# Patient Record
Sex: MALE | Race: BLACK OR AFRICAN AMERICAN | NOT HISPANIC OR LATINO | URBAN - METROPOLITAN AREA
[De-identification: names, ages, dates, MRNs, and addresses within clinical notes are randomized per-mention and may not be internally consistent; named-entity substitution may affect disease eponyms.]

---

## 2020-04-11 ENCOUNTER — EMERGENCY (EMERGENCY)
Facility: HOSPITAL | Age: 80
LOS: 0 days | Discharge: HOME | End: 2020-04-11
Attending: EMERGENCY MEDICINE
Payer: COMMERCIAL

## 2020-04-11 VITALS
SYSTOLIC BLOOD PRESSURE: 156 MMHG | DIASTOLIC BLOOD PRESSURE: 96 MMHG | HEART RATE: 101 BPM | TEMPERATURE: 103 F | RESPIRATION RATE: 21 BRPM

## 2020-04-11 VITALS
TEMPERATURE: 102 F | OXYGEN SATURATION: 95 % | SYSTOLIC BLOOD PRESSURE: 156 MMHG | DIASTOLIC BLOOD PRESSURE: 85 MMHG | HEART RATE: 101 BPM | RESPIRATION RATE: 20 BRPM

## 2020-04-11 DIAGNOSIS — E11.9 TYPE 2 DIABETES MELLITUS WITHOUT COMPLICATIONS: ICD-10-CM

## 2020-04-11 DIAGNOSIS — U07.1 COVID-19: ICD-10-CM

## 2020-04-11 DIAGNOSIS — R53.1 WEAKNESS: ICD-10-CM

## 2020-04-11 DIAGNOSIS — J12.89 OTHER VIRAL PNEUMONIA: ICD-10-CM

## 2020-04-11 DIAGNOSIS — R10.9 UNSPECIFIED ABDOMINAL PAIN: ICD-10-CM

## 2020-04-11 DIAGNOSIS — R50.9 FEVER, UNSPECIFIED: ICD-10-CM

## 2020-04-11 DIAGNOSIS — I10 ESSENTIAL (PRIMARY) HYPERTENSION: ICD-10-CM

## 2020-04-11 LAB
ALBUMIN SERPL ELPH-MCNC: 3 G/DL — LOW (ref 3.3–5)
ALP SERPL-CCNC: 54 U/L — SIGNIFICANT CHANGE UP (ref 40–120)
ALT FLD-CCNC: 18 U/L — SIGNIFICANT CHANGE UP (ref 12–78)
ANION GAP SERPL CALC-SCNC: 8 MMOL/L — SIGNIFICANT CHANGE UP (ref 5–17)
APTT BLD: 36.1 SEC — SIGNIFICANT CHANGE UP (ref 28.5–37)
AST SERPL-CCNC: 22 U/L — SIGNIFICANT CHANGE UP (ref 15–37)
BASOPHILS # BLD AUTO: 0.01 K/UL — SIGNIFICANT CHANGE UP (ref 0–0.2)
BASOPHILS NFR BLD AUTO: 0.2 % — SIGNIFICANT CHANGE UP (ref 0–2)
BILIRUB SERPL-MCNC: 0.5 MG/DL — SIGNIFICANT CHANGE UP (ref 0.2–1.2)
BUN SERPL-MCNC: 14 MG/DL — SIGNIFICANT CHANGE UP (ref 7–23)
CALCIUM SERPL-MCNC: 8.1 MG/DL — LOW (ref 8.5–10.1)
CHLORIDE SERPL-SCNC: 104 MMOL/L — SIGNIFICANT CHANGE UP (ref 96–108)
CO2 SERPL-SCNC: 27 MMOL/L — SIGNIFICANT CHANGE UP (ref 22–31)
CREAT SERPL-MCNC: 1.23 MG/DL — SIGNIFICANT CHANGE UP (ref 0.5–1.3)
EOSINOPHIL # BLD AUTO: 0 K/UL — SIGNIFICANT CHANGE UP (ref 0–0.5)
EOSINOPHIL NFR BLD AUTO: 0 % — SIGNIFICANT CHANGE UP (ref 0–6)
GLUCOSE SERPL-MCNC: 92 MG/DL — SIGNIFICANT CHANGE UP (ref 70–99)
HCT VFR BLD CALC: 39.9 % — SIGNIFICANT CHANGE UP (ref 39–50)
HGB BLD-MCNC: 14.1 G/DL — SIGNIFICANT CHANGE UP (ref 13–17)
IMM GRANULOCYTES NFR BLD AUTO: 0.6 % — SIGNIFICANT CHANGE UP (ref 0–1.5)
INR BLD: 1.15 RATIO — SIGNIFICANT CHANGE UP (ref 0.88–1.16)
LACTATE SERPL-SCNC: 1.3 MMOL/L — SIGNIFICANT CHANGE UP (ref 0.7–2)
LIDOCAIN IGE QN: 266 U/L — SIGNIFICANT CHANGE UP (ref 73–393)
LYMPHOCYTES # BLD AUTO: 0.39 K/UL — LOW (ref 1–3.3)
LYMPHOCYTES # BLD AUTO: 8 % — LOW (ref 13–44)
MCHC RBC-ENTMCNC: 27.3 PG — SIGNIFICANT CHANGE UP (ref 27–34)
MCHC RBC-ENTMCNC: 35.3 GM/DL — SIGNIFICANT CHANGE UP (ref 32–36)
MCV RBC AUTO: 77.3 FL — LOW (ref 80–100)
MONOCYTES # BLD AUTO: 0.24 K/UL — SIGNIFICANT CHANGE UP (ref 0–0.9)
MONOCYTES NFR BLD AUTO: 4.9 % — SIGNIFICANT CHANGE UP (ref 2–14)
NEUTROPHILS # BLD AUTO: 4.21 K/UL — SIGNIFICANT CHANGE UP (ref 1.8–7.4)
NEUTROPHILS NFR BLD AUTO: 86.3 % — HIGH (ref 43–77)
NRBC # BLD: 0 /100 WBCS — SIGNIFICANT CHANGE UP (ref 0–0)
PLATELET # BLD AUTO: 88 K/UL — LOW (ref 150–400)
POTASSIUM SERPL-MCNC: 4.1 MMOL/L — SIGNIFICANT CHANGE UP (ref 3.5–5.3)
POTASSIUM SERPL-SCNC: 4.1 MMOL/L — SIGNIFICANT CHANGE UP (ref 3.5–5.3)
PROT SERPL-MCNC: 6.9 GM/DL — SIGNIFICANT CHANGE UP (ref 6–8.3)
PROTHROM AB SERPL-ACNC: 12.9 SEC — SIGNIFICANT CHANGE UP (ref 10–12.9)
RBC # BLD: 5.16 M/UL — SIGNIFICANT CHANGE UP (ref 4.2–5.8)
RBC # FLD: 13.9 % — SIGNIFICANT CHANGE UP (ref 10.3–14.5)
SARS-COV-2 RNA SPEC QL NAA+PROBE: DETECTED
SODIUM SERPL-SCNC: 139 MMOL/L — SIGNIFICANT CHANGE UP (ref 135–145)
TROPONIN I SERPL-MCNC: 0.02 NG/ML — SIGNIFICANT CHANGE UP (ref 0.01–0.04)
WBC # BLD: 4.88 K/UL — SIGNIFICANT CHANGE UP (ref 3.8–10.5)
WBC # FLD AUTO: 4.88 K/UL — SIGNIFICANT CHANGE UP (ref 3.8–10.5)

## 2020-04-11 PROCEDURE — 99284 EMERGENCY DEPT VISIT MOD MDM: CPT

## 2020-04-11 PROCEDURE — 71045 X-RAY EXAM CHEST 1 VIEW: CPT | Mod: 26

## 2020-04-11 RX ORDER — ACETAMINOPHEN 500 MG
975 TABLET ORAL ONCE
Refills: 0 | Status: COMPLETED | OUTPATIENT
Start: 2020-04-11 | End: 2020-04-11

## 2020-04-11 RX ORDER — ACETAMINOPHEN 500 MG
2 TABLET ORAL
Qty: 56 | Refills: 0
Start: 2020-04-11 | End: 2020-04-17

## 2020-04-11 RX ADMIN — Medication 975 MILLIGRAM(S): at 16:15

## 2020-04-11 NOTE — ED PROVIDER NOTE - OBJECTIVE STATEMENT
Pt is a 80 year old male w/no significant PMH who presents to the ED today for fever x 7 days. Pt c/o abdominal pain. Denies N/V/D, SOB, cough, headache, or chills. His son in law has COVID 19. Patient denies EtOH/tobacco/illicit substance use.

## 2020-04-11 NOTE — ED PROVIDER NOTE - CLINICAL SUMMARY MEDICAL DECISION MAKING FREE TEXT BOX
Pt with Covid related abd discomfort nontender abd, xray chest noted Covid like infiltrate - will dc with tylenol.

## 2020-04-11 NOTE — ED PROVIDER NOTE - PATIENT PORTAL LINK FT
You can access the FollowMyHealth Patient Portal offered by [facility] by registering at the following website: [email or web address]. By joining [facility]’s FollowMyHealth portal, you will also be able to view your health information using other applications (apps) compatible with our system.

## 2020-04-11 NOTE — ED ADULT NURSE NOTE - OBJECTIVE STATEMENT
received er bed 9 biba from home with fever,generalized weakness and abdominal discomfort x 1 week family member at homewith + confirmed covid a&ox3 denies vomiting or diarrhea abdomen soft no distention denies cough or shortness of breath mild basilar crackles audible

## 2024-08-29 NOTE — ED PROVIDER NOTE - NS_ATTENDINGSCRIBE_ED_ALL_ED
The patient arrives via [facility] [place] from [facility]. Pt was sitting at the table after breakfast and staff witnessed a syncopal episode lasting 2-3 minutes. Pt head down, no fall, no injuroes. Pt awoke and had an epiod eof vomiting. Pt is alert x self only baseline, Pt fell a couple of weeks ago and has c/o back pain. Pt stating owe with movement. Rn noted pain with movement.      Triage Assessment (Adult)       Row Name 08/29/24 1046          Triage Assessment    Airway WDL WDL        Respiratory WDL    Respiratory WDL WDL        Skin Circulation/Temperature WDL    Skin Circulation/Temperature WDL WDL        Cardiac WDL    Cardiac WDL X        Peripheral/Neurovascular WDL    Peripheral Neurovascular WDL WDL        Cognitive/Neuro/Behavioral WDL    Cognitive/Neuro/Behavioral WDL X;orientation     Level of Consciousness confused;alert     Orientation disoriented to;place;time;situation     Speech clear     Mood/Behavior anxious        Toir Coma Scale    Best Eye Response 4-->(E4) spontaneous     Best Motor Response 6-->(M6) obeys commands     Best Verbal Response 4-->(V4) confused     [place] Coma Scale Score 14                     
I personally performed the service described in the documentation recorded by the scribe in my presence, and it accurately and completely records my words and actions.

## 2025-06-17 ENCOUNTER — INPATIENT (INPATIENT)
Facility: HOSPITAL | Age: 85
LOS: 5 days | Discharge: HOME | End: 2025-06-23
Attending: STUDENT IN AN ORGANIZED HEALTH CARE EDUCATION/TRAINING PROGRAM | Admitting: STUDENT IN AN ORGANIZED HEALTH CARE EDUCATION/TRAINING PROGRAM
Payer: MEDICAID

## 2025-06-17 VITALS
SYSTOLIC BLOOD PRESSURE: 174 MMHG | DIASTOLIC BLOOD PRESSURE: 86 MMHG | WEIGHT: 171.74 LBS | HEART RATE: 108 BPM | TEMPERATURE: 97 F | OXYGEN SATURATION: 96 % | RESPIRATION RATE: 15 BRPM | HEIGHT: 75 IN

## 2025-06-17 DIAGNOSIS — I10 ESSENTIAL (PRIMARY) HYPERTENSION: ICD-10-CM

## 2025-06-17 DIAGNOSIS — R47.01 APHASIA: ICD-10-CM

## 2025-06-17 DIAGNOSIS — N17.9 ACUTE KIDNEY FAILURE, UNSPECIFIED: ICD-10-CM

## 2025-06-17 DIAGNOSIS — T83.511A INFECTION AND INFLAMMATORY REACTION DUE TO INDWELLING URETHRAL CATHETER, INITIAL ENCOUNTER: ICD-10-CM

## 2025-06-17 LAB
ALBUMIN SERPL ELPH-MCNC: 3.8 G/DL — SIGNIFICANT CHANGE UP (ref 3.3–5)
ALP SERPL-CCNC: 74 U/L — SIGNIFICANT CHANGE UP (ref 40–120)
ALT FLD-CCNC: 21 U/L — SIGNIFICANT CHANGE UP (ref 12–78)
ANION GAP SERPL CALC-SCNC: 5 MMOL/L — SIGNIFICANT CHANGE UP (ref 5–17)
APPEARANCE UR: ABNORMAL
APPEARANCE UR: CLEAR — SIGNIFICANT CHANGE UP
APTT BLD: 27.8 SEC — SIGNIFICANT CHANGE UP (ref 26.1–36.8)
AST SERPL-CCNC: 23 U/L — SIGNIFICANT CHANGE UP (ref 15–37)
BACTERIA # UR AUTO: ABNORMAL /HPF
BASOPHILS # BLD AUTO: 0.02 K/UL — SIGNIFICANT CHANGE UP (ref 0–0.2)
BASOPHILS NFR BLD AUTO: 0.2 % — SIGNIFICANT CHANGE UP (ref 0–2)
BILIRUB SERPL-MCNC: 0.7 MG/DL — SIGNIFICANT CHANGE UP (ref 0.2–1.2)
BILIRUB UR-MCNC: NEGATIVE — SIGNIFICANT CHANGE UP
BILIRUB UR-MCNC: NEGATIVE — SIGNIFICANT CHANGE UP
BUN SERPL-MCNC: 16 MG/DL — SIGNIFICANT CHANGE UP (ref 7–23)
CALCIUM SERPL-MCNC: 9.7 MG/DL — SIGNIFICANT CHANGE UP (ref 8.5–10.1)
CHLORIDE SERPL-SCNC: 108 MMOL/L — SIGNIFICANT CHANGE UP (ref 96–108)
CO2 SERPL-SCNC: 25 MMOL/L — SIGNIFICANT CHANGE UP (ref 22–31)
COLOR SPEC: ABNORMAL
COLOR SPEC: YELLOW — SIGNIFICANT CHANGE UP
CREAT SERPL-MCNC: 1.53 MG/DL — HIGH (ref 0.5–1.3)
DIFF PNL FLD: ABNORMAL
DIFF PNL FLD: ABNORMAL
EGFR: 44 ML/MIN/1.73M2 — LOW
EGFR: 44 ML/MIN/1.73M2 — LOW
EOSINOPHIL # BLD AUTO: 0.07 K/UL — SIGNIFICANT CHANGE UP (ref 0–0.5)
EOSINOPHIL NFR BLD AUTO: 0.7 % — SIGNIFICANT CHANGE UP (ref 0–6)
EPI CELLS # UR: PRESENT
GLUCOSE SERPL-MCNC: 109 MG/DL — HIGH (ref 70–99)
GLUCOSE UR QL: NEGATIVE MG/DL — SIGNIFICANT CHANGE UP
GLUCOSE UR QL: NEGATIVE MG/DL — SIGNIFICANT CHANGE UP
HCT VFR BLD CALC: 46.4 % — SIGNIFICANT CHANGE UP (ref 39–50)
HGB BLD-MCNC: 16.4 G/DL — SIGNIFICANT CHANGE UP (ref 13–17)
IMM GRANULOCYTES NFR BLD AUTO: 0.3 % — SIGNIFICANT CHANGE UP (ref 0–0.9)
INR BLD: 0.99 RATIO — SIGNIFICANT CHANGE UP (ref 0.85–1.16)
KETONES UR QL: NEGATIVE MG/DL — SIGNIFICANT CHANGE UP
KETONES UR QL: NEGATIVE MG/DL — SIGNIFICANT CHANGE UP
LEUKOCYTE ESTERASE UR-ACNC: ABNORMAL
LEUKOCYTE ESTERASE UR-ACNC: ABNORMAL
LYMPHOCYTES # BLD AUTO: 1.41 K/UL — SIGNIFICANT CHANGE UP (ref 1–3.3)
LYMPHOCYTES # BLD AUTO: 14.8 % — SIGNIFICANT CHANGE UP (ref 13–44)
MCHC RBC-ENTMCNC: 27 PG — SIGNIFICANT CHANGE UP (ref 27–34)
MCHC RBC-ENTMCNC: 35.3 G/DL — SIGNIFICANT CHANGE UP (ref 32–36)
MCV RBC AUTO: 76.4 FL — LOW (ref 80–100)
MONOCYTES # BLD AUTO: 1.14 K/UL — HIGH (ref 0–0.9)
MONOCYTES NFR BLD AUTO: 12 % — SIGNIFICANT CHANGE UP (ref 2–14)
NEUTROPHILS # BLD AUTO: 6.85 K/UL — SIGNIFICANT CHANGE UP (ref 1.8–7.4)
NEUTROPHILS NFR BLD AUTO: 72 % — SIGNIFICANT CHANGE UP (ref 43–77)
NITRITE UR-MCNC: NEGATIVE — SIGNIFICANT CHANGE UP
NITRITE UR-MCNC: NEGATIVE — SIGNIFICANT CHANGE UP
NRBC BLD AUTO-RTO: 0 /100 WBCS — SIGNIFICANT CHANGE UP (ref 0–0)
PH UR: 6.5 — SIGNIFICANT CHANGE UP (ref 5–8)
PH UR: 6.5 — SIGNIFICANT CHANGE UP (ref 5–8)
PLATELET # BLD AUTO: 133 K/UL — LOW (ref 150–400)
POTASSIUM SERPL-MCNC: 3.9 MMOL/L — SIGNIFICANT CHANGE UP (ref 3.5–5.3)
POTASSIUM SERPL-SCNC: 3.9 MMOL/L — SIGNIFICANT CHANGE UP (ref 3.5–5.3)
PROT SERPL-MCNC: 8 GM/DL — SIGNIFICANT CHANGE UP (ref 6–8.3)
PROT UR-MCNC: 30 MG/DL
PROT UR-MCNC: SIGNIFICANT CHANGE UP MG/DL
PROTHROM AB SERPL-ACNC: 11.5 SEC — SIGNIFICANT CHANGE UP (ref 9.9–13.4)
RBC # BLD: 6.07 M/UL — HIGH (ref 4.2–5.8)
RBC # FLD: 15.3 % — HIGH (ref 10.3–14.5)
RBC CASTS # UR COMP ASSIST: 20 /HPF — HIGH (ref 0–4)
SODIUM SERPL-SCNC: 138 MMOL/L — SIGNIFICANT CHANGE UP (ref 135–145)
SP GR SPEC: >1.03 — HIGH (ref 1–1.03)
SP GR SPEC: >1.03 — HIGH (ref 1–1.03)
TROPONIN I, HIGH SENSITIVITY RESULT: 31.6 NG/L — SIGNIFICANT CHANGE UP
UROBILINOGEN FLD QL: 1 MG/DL — SIGNIFICANT CHANGE UP (ref 0.2–1)
UROBILINOGEN FLD QL: 1 MG/DL — SIGNIFICANT CHANGE UP (ref 0.2–1)
WBC # BLD: 9.52 K/UL — SIGNIFICANT CHANGE UP (ref 3.8–10.5)
WBC # FLD AUTO: 9.52 K/UL — SIGNIFICANT CHANGE UP (ref 3.8–10.5)
WBC UR QL: 15 /HPF — HIGH (ref 0–5)

## 2025-06-17 PROCEDURE — 99223 1ST HOSP IP/OBS HIGH 75: CPT

## 2025-06-17 PROCEDURE — 0042T: CPT

## 2025-06-17 PROCEDURE — 70498 CT ANGIOGRAPHY NECK: CPT | Mod: 26

## 2025-06-17 PROCEDURE — 70450 CT HEAD/BRAIN W/O DYE: CPT | Mod: 26,59

## 2025-06-17 PROCEDURE — 99291 CRITICAL CARE FIRST HOUR: CPT

## 2025-06-17 PROCEDURE — 70496 CT ANGIOGRAPHY HEAD: CPT | Mod: 26

## 2025-06-17 PROCEDURE — 93010 ELECTROCARDIOGRAM REPORT: CPT

## 2025-06-17 RX ORDER — CEFTRIAXONE 500 MG/1
1000 INJECTION, POWDER, FOR SOLUTION INTRAMUSCULAR; INTRAVENOUS EVERY 24 HOURS
Refills: 0 | Status: COMPLETED | OUTPATIENT
Start: 2025-06-17 | End: 2025-06-20

## 2025-06-17 RX ORDER — MELATONIN 5 MG
3 TABLET ORAL AT BEDTIME
Refills: 0 | Status: DISCONTINUED | OUTPATIENT
Start: 2025-06-17 | End: 2025-06-23

## 2025-06-17 RX ORDER — ACETAMINOPHEN 500 MG/5ML
650 LIQUID (ML) ORAL EVERY 6 HOURS
Refills: 0 | Status: DISCONTINUED | OUTPATIENT
Start: 2025-06-17 | End: 2025-06-23

## 2025-06-17 RX ORDER — ONDANSETRON HCL/PF 4 MG/2 ML
4 VIAL (ML) INJECTION EVERY 8 HOURS
Refills: 0 | Status: DISCONTINUED | OUTPATIENT
Start: 2025-06-17 | End: 2025-06-23

## 2025-06-17 RX ORDER — ATORVASTATIN CALCIUM 80 MG/1
40 TABLET, FILM COATED ORAL AT BEDTIME
Refills: 0 | Status: DISCONTINUED | OUTPATIENT
Start: 2025-06-17 | End: 2025-06-23

## 2025-06-17 RX ORDER — MAGNESIUM, ALUMINUM HYDROXIDE 200-200 MG
30 TABLET,CHEWABLE ORAL EVERY 4 HOURS
Refills: 0 | Status: DISCONTINUED | OUTPATIENT
Start: 2025-06-17 | End: 2025-06-23

## 2025-06-17 RX ORDER — ASPIRIN 325 MG
81 TABLET ORAL DAILY
Refills: 0 | Status: DISCONTINUED | OUTPATIENT
Start: 2025-06-17 | End: 2025-06-23

## 2025-06-17 RX ADMIN — Medication 1000 MILLILITER(S): at 18:28

## 2025-06-17 RX ADMIN — Medication 81 MILLIGRAM(S): at 19:28

## 2025-06-17 RX ADMIN — ATORVASTATIN CALCIUM 40 MILLIGRAM(S): 80 TABLET, FILM COATED ORAL at 22:25

## 2025-06-17 NOTE — H&P ADULT - HISTORY OF PRESENT ILLNESS
Saintony Guerrier is an 85 year old male with PMHx of HTN and urinary retention (with indwelling Lind catheter) who presented to the ED on 6/17/25 for complaints of passing out and inability to speak.    Slovak Creole Hayes ID #[id number] utilized. Patient was recently admitted to [facility] for left eye blurry vision, headache, and elevated blood pressure x 9 days. At that time, CT brain without acute intracranial findings. CTA H/N without LVO or stenosis. TTE and MRI brain unremarkable. Evaluated by cardiology for elevated blood pressure who recommended addition of HCTZ. Course complicated by acute urinary retention requiring Lind catheter placement. Planned to follow up with urology for voiding trial. Discharged this morning. When he returned home, family came over to assist him in the bathroom. Patient yelled out for help and passed out. Did not fall as family caught him. Then regained consciousness and did not speak but had eyes open. Does not recall episode. No tongue biting, urinary or bowel incontinence. Baseline functional status is ambulates unassisted and independent with all ADLs. Lives at home with daughter and son.    In the ED, VSS except HR as elevated as 108 and BP as elevated as 174/86. Plts 133K, Cr 1.53. U/A (obtained from old Lind) with trace leuks, negative nitrites, moderate blood, WBC 15, RBC 20, moderate bacteria, squamous epithelial cells present. U/A (obtained from new Lind) with proteinuria, small leuks, negative nitrites, large blood, WBC 10, RBC 35, many bacteria, squamous epithelial cells present. CT head without acute intracranial findings. CTA head and neck without hemodynamically significant stenosis. CT brain perfusion demonstrates delayed mean transit time in the medial left frontal lobe with mismatched volume of 5 mL. Ischemic penumbra not definitely excluded. Received 1L NS bolus. Requested ER physician to contact neurology, who recommends MRI brain.

## 2025-06-17 NOTE — ED ADULT TRIAGE NOTE - CHIEF COMPLAINT QUOTE
BIBA from home - as per EMS discharge from [facility], Came here for near-  syncopal episode as per family member patient  shouted for help while showering . patient assited to bed c/o of dizziness,  family states   patient not able to speak, baseline a&o3/ ambulatory / patient unable to speak in triage  last known normal 3:20pm patient  unable to follow commands  in triage  20G right ac placed by EMS

## 2025-06-17 NOTE — H&P ADULT - NSHPPHYSICALEXAM_GEN_ALL_CORE
T(C): 37.2 (06-17-25 @ 21:24), Max: 37.2 (06-17-25 @ 21:24)  HR: 92 (06-17-25 @ 21:24) (92 - 108)  BP: 182/88 (06-17-25 @ 21:24) (168/89 - 182/88)  RR: 18 (06-17-25 @ 21:24) (15 - 19)  SpO2: 98% (06-17-25 @ 21:24) (96% - 98%)    CONSTITUTIONAL: Well groomed, no apparent distress, pleasant, conversational  EYES: PERRLA and symmetric, EOMI  ENMT: Oral mucosa with moist membranes  RESP: No respiratory distress, no use of accessory muscles; CTA b/l  CV: RRR  GI: Soft, NT, ND  NEURO: alert, speaking in complete sentences, CN II-XII intact; sensation intact in upper and lower extremities b/l to light touch

## 2025-06-17 NOTE — ED PROVIDER NOTE - PROGRESS NOTE DETAILS
On reassessment patient with significantly improved symptoms, speaking and following commands with intact strength and sensation

## 2025-06-17 NOTE — ED PROVIDER NOTE - PHYSICAL EXAMINATION
General appearance: Nontoxic appearing, conversant, afebrile    Eyes: anicteric sclerae, KRISH, EOMI   HENT: Atraumatic; oropharynx clear, MMM and no ulcerations, no pharyngeal erythema or exudate   Neck: Trachea midline; Full range of motion, supple   Pulm: CTA bl, normal respiratory effort and no intercostal retractions, normal work of breathing   CV: RRR, No murmurs, rubs, or gallops   Abdomen: Soft, non-tender, non-distended; no guarding or rebound   Extremities: No peripheral edema, no gross deformities, FROM x4   Skin: Dry, normal temperature, turgor and texture; no rash   Psych: Appropriate affect, cooperative

## 2025-06-17 NOTE — GOALS OF CARE CONVERSATION - ADVANCED CARE PLANNING - CONVERSATION DETAILS
Robyn Myers Hayes ID #[id number] utilized. Code status is full code. Would want chest compressions and intubation if needed. Wants all life-sustaining measures. No limitations on care at this time. Wants daughter to make his medical decisions for him if he were to become unable to do so on his own.

## 2025-06-17 NOTE — H&P ADULT - NSHPLABSRESULTS_GEN_ALL_CORE
16.4   9.52  )-----------( 133      ( 17 Jun 2025 17:05 )             46.4     138  |  108  |  16  ----------------------------<  109[H]     06-17  3.9   |  25  |  1.53[H]    Ca    9.7      17 Jun 2025 17:05    TPro  8.0  /  Alb  3.8  /  TBili  0.7  /  DBili  x   /  AST  23  /  ALT  21  /  AlkPhos  74  06-17    PT/INR: 11.5/0.99 (06-17-25 @ 17:05)  PTT: 27.8 (06-17-25 @ 17:05)    Urinalysis Basic - ( 17 Jun 2025 22:30 )  Color: Orange / Appearance: Cloudy / SG: >1.030 / pH: 6.5  Gluc: Negative mg/dL / Ketone: x  / Bili: Negative / Urobili: 1.0 mg/dL   Blood: Large / Protein: 30 mg/dL / Nitrite: Negative   Leuk Esterase: Small / RBC: 35 /HPF / WBC 10 /HPF   Sq Epi: x / Bacteria: Many /HPF  Hyaline Casts: x/WBC Casts: x    Urinalysis with Rflx Culture (collected 17 Jun 2025 17:40)    CT brain stroke protocol 6/17/25  IMPRESSION:  No acute intracranial hemorrhage or acute territorial infarct. If acute ischemia is a strong clinical concern, MRI exam is recommended for further evaluation if there is no contraindication. Dr. Auguste notified 6/17/2025 at 4:54 PM    CTA brain and neck stroke protocol with IV contrast 6/17/25  IMPRESSION:  No hemodynamically significant stenosis    CT brain perfusion maps stroke 6/17/25  IMPRESSION:  CT perfusion demonstrates delayed mean transit time in the medial left frontal lobe with mismatched volume of 5 mL. Ischemic penumbra not definitely excluded. MRI exam recommended.

## 2025-06-17 NOTE — ED PROVIDER NOTE - CRITICAL CARE ATTENDING CONTRIBUTION TO CARE
Upon my evaluation, this patient had a high probability of imminent or life-threatening deterioration due to code stroke, which required my direct attention, intervention, and personal management.  The patient has a  medical condition that impairs one or more vital organ systems.  Frequent personal assessment and adjustment of medical interventions was performed.      I have personally provided 31 minutes of critical care time exclusive of time spent on separately billable procedures. Time includes review of laboratory data, radiology results, discussion with consultants, patient and family; monitoring for potential decompensation, as well as time spent retrieving data and reviewing the chart and documenting the visit. Interventions were performed as documented above.

## 2025-06-17 NOTE — ED ADULT NURSE NOTE - CHIEF COMPLAINT QUOTE
BIBA from home - as per EMS discharge from [facility], Came here for near-  syncopal episode as per family member patient  shouted for help while showering . patient assited to bed c/o of dizziness,  family states   patient not able to speak, baseline a&o3/ ambulatory / patient unable to speak in triage  last known normal 3:20pm patient  unable to follow commands  in triage  20G right ac placed by EMS

## 2025-06-17 NOTE — ED ADULT NURSE NOTE - TEMPLATE LIST FOR HEAD TO TOE ASSESSMENT
Problem: Occupational Therapy Goal  Goal: Occupational Therapy Goal  Goals to be met by: 7 days (3/15/19)     Patient will increase functional independence with ADLs by performing:    UE Dressing with Moderate Assistance.  Grooming while EOB with Minimal Assistance.  Toileting from bedside commode with Maximum Assistance for hygiene and clothing management.   Supine to sit with Moderate Assistance.  Toilet transfer to bedside commode with Maximum Assistance.  Increased functional strength to WFL for ADLs.     Outcome: Ongoing (interventions implemented as appropriate)  Con't POC.    JUSTIN Lr         Neuro

## 2025-06-17 NOTE — H&P ADULT - ASSESSMENT
Saintony Guerrier is an 85 year old male with PMHx of HTN and urinary retention (with indwelling Lind catheter) who presented to the ED on 6/17/25 for complaints of passing out and inability to speak and admitted for syncope vs. TIA vs. CVA.    Syncope vs. TIA vs. CVA  In pt with recent admission for left eye blurry vision and headache x 9 days, hospitalized at [facility], CT head, CTA H/N, TTE, and MRI brain were all unremarkable  Patient yelled out for help while in the bathroom, passed out, did not fall as family caught him  Then regained consciousness and did not speak but had eyes open, no reported tongue biting, urinary or bowel incontinence  Patient himself does not recall episode, symptoms resolved while in the ER  NIHSS 9 as per ER physician documentation, not TNK candidate given improved symptoms  CT head without acute intracranial findings  CTA head and neck without hemodynamically significant stenosis  CT brain perfusion demonstrates delayed mean transit time in the medial L. frontal lobe with mismatched volume of 5 mL, ischemic penumbra not definitely excluded  Neuro checks q4, diet started given passed bedside dysphagia eval as per ER RN  ASA 81 mg and atorvastatin 40 mg started  Allowing for permissive HTN up to 220/120 for first 24-48 hours, DVT ppx with SCDs for now  F/u TSH, lipid panel, A1c for risk stratification  F/u MRI brain as recommended by neurology  Telemetry to monitor for arrhythmias  PT/OT consulted  Pending official neurology eval - please f/u in AM    CAUTI  Underwent Lind catheter placement for acute urinary retention during recent hospitalization at [facility]  U/A (obtained from new Lind) with proteinuria, small leuks, negative nitrites, large blood, WBC 10, RBC 35, many bacteria, squamous epithelial cells present  Ceftriaxone 1 g IV started   F/u urine culture  Consider voiding trial prior to discharge    GAMA  Cr 1.53 on admission, unknown baseline Cr  S/p 1L NS bolus in the ED  Avoid nephrotoxins, renally dose meds  Encourage PO hydration  Monitor renal function      Chronic medical conditions:  HTN, uncontrolled: BP as elevated as 174/86 on admission, PTA hydralazine 100 mg BID, amlodipine 10 mg, lisinopril 40 mg held due to allowing for permissive HTN, recent dc summary states was recommended to start on HCTZ but patient is not on it?  Thrombocytopenia, chronic: plts 133K on admission, unknown baseline but plts 114K (on [facility] H&P on 6/15/25), no signs of active bleeding    Medication reconciliation completed using med list provided by patient.

## 2025-06-17 NOTE — ED PROVIDER NOTE - CLINICAL SUMMARY MEDICAL DECISION MAKING FREE TEXT BOX
86yo male with pmh dm, htn, presenting with difficulty speaking.  Here with family who reports he was at [facility] for past 2 days diagnosed with uti and had catheter placed for retention.  Today after getting discharged was in the bathroom and called out for help.  Family came over and caught him and prevented him from falling but he did syncopize briefly.  Since returning to consciousness he has been unresponsive and not following commands.  Creole speaking and with  and family he seems to not follow what is going on and struggling to speak although he is awake and tearful.  Moving extremities.  Limited neuro exam 2/2 cooperation/ aphasia.  Code stroke called from triage.  Will need labs, urine, cts, likely admission.

## 2025-06-17 NOTE — ED ADULT NURSE NOTE - NSFALLHARMRISKINTERV_ED_ALL_ED
Assistance OOB with selected safe patient handling equipment if applicable/Communicate risk of Fall with Harm to all staff, patient, and family/Provide visual cue: red socks, yellow wristband, yellow gown, etc/Reinforce activity limits and safety measures with patient and family/Bed in lowest position, wheels locked, appropriate side rails in place/Call bell, personal items and telephone in reach/Instruct patient to call for assistance before getting out of bed/chair/stretcher/Non-slip footwear applied when patient is off stretcher/[place] to call system/Physically safe environment - no spills, clutter or unnecessary equipment/Purposeful Proactive Rounding/Room/bathroom lighting operational, light cord in reach

## 2025-06-18 LAB
A1C WITH ESTIMATED AVERAGE GLUCOSE RESULT: 5.5 % — SIGNIFICANT CHANGE UP (ref 4–5.6)
ANION GAP SERPL CALC-SCNC: 3 MMOL/L — LOW (ref 5–17)
BUN SERPL-MCNC: 17 MG/DL — SIGNIFICANT CHANGE UP (ref 7–23)
CALCIUM SERPL-MCNC: 8.9 MG/DL — SIGNIFICANT CHANGE UP (ref 8.5–10.1)
CHLORIDE SERPL-SCNC: 111 MMOL/L — HIGH (ref 96–108)
CHOLEST SERPL-MCNC: 162 MG/DL — SIGNIFICANT CHANGE UP
CO2 SERPL-SCNC: 28 MMOL/L — SIGNIFICANT CHANGE UP (ref 22–31)
CREAT SERPL-MCNC: 1.37 MG/DL — HIGH (ref 0.5–1.3)
EGFR: 50 ML/MIN/1.73M2 — LOW
EGFR: 50 ML/MIN/1.73M2 — LOW
ESTIMATED AVERAGE GLUCOSE: 111 MG/DL — SIGNIFICANT CHANGE UP (ref 68–114)
GLUCOSE SERPL-MCNC: 97 MG/DL — SIGNIFICANT CHANGE UP (ref 70–99)
HDLC SERPL-MCNC: 58 MG/DL — SIGNIFICANT CHANGE UP
LDLC SERPL-MCNC: 95 MG/DL — SIGNIFICANT CHANGE UP
LIPID PNL WITH DIRECT LDL SERPL: 95 MG/DL — SIGNIFICANT CHANGE UP
NONHDLC SERPL-MCNC: 105 MG/DL — SIGNIFICANT CHANGE UP
POTASSIUM SERPL-MCNC: 4 MMOL/L — SIGNIFICANT CHANGE UP (ref 3.5–5.3)
POTASSIUM SERPL-SCNC: 4 MMOL/L — SIGNIFICANT CHANGE UP (ref 3.5–5.3)
SODIUM SERPL-SCNC: 142 MMOL/L — SIGNIFICANT CHANGE UP (ref 135–145)
TRIGL SERPL-MCNC: 44 MG/DL — SIGNIFICANT CHANGE UP
TSH SERPL-MCNC: 0.51 UU/ML — SIGNIFICANT CHANGE UP (ref 0.36–3.74)

## 2025-06-18 PROCEDURE — 99232 SBSQ HOSP IP/OBS MODERATE 35: CPT

## 2025-06-18 PROCEDURE — 99222 1ST HOSP IP/OBS MODERATE 55: CPT

## 2025-06-18 PROCEDURE — 70551 MRI BRAIN STEM W/O DYE: CPT | Mod: 26

## 2025-06-18 RX ADMIN — Medication 81 MILLIGRAM(S): at 12:26

## 2025-06-18 RX ADMIN — CEFTRIAXONE 100 MILLIGRAM(S): 500 INJECTION, POWDER, FOR SOLUTION INTRAMUSCULAR; INTRAVENOUS at 05:09

## 2025-06-18 RX ADMIN — Medication 650 MILLIGRAM(S): at 17:49

## 2025-06-18 RX ADMIN — Medication 650 MILLIGRAM(S): at 16:37

## 2025-06-18 RX ADMIN — ATORVASTATIN CALCIUM 40 MILLIGRAM(S): 80 TABLET, FILM COATED ORAL at 21:08

## 2025-06-18 NOTE — PHYSICAL THERAPY INITIAL EVALUATION ADULT - ADDITIONAL COMMENTS
Pt lives with child in an apartment with 3 steps with no rails to enter. Once inside, the pt main bedroom and bathroom is on that floor when entering. The pt ambulates with no device and does not own any device for ambulation.

## 2025-06-18 NOTE — OCCUPATIONAL THERAPY INITIAL EVALUATION ADULT - STRENGTHENING, PT EVAL
Pt will increase BUE/BLE strength to 5/5 to improve functional strength needed to engage in functional tasks by 1-2 weeks

## 2025-06-18 NOTE — PROGRESS NOTE ADULT - SUBJECTIVE AND OBJECTIVE BOX
Patient is a 85y old  Male who presents with a chief complaint of Syncope vs. TIA vs. CVA (18 Jun 2025 11:41)    INTERVAL HPI/OVERNIGHT EVENTS: No acute events overnight. HD stable.     MEDICATIONS  (STANDING):  aspirin  chewable 81 milliGRAM(s) Oral daily  atorvastatin 40 milliGRAM(s) Oral at bedtime  cefTRIAXone   IVPB 1000 milliGRAM(s) IV Intermittent every 24 hours    MEDICATIONS  (PRN):  acetaminophen     Tablet .. 650 milliGRAM(s) Oral every 6 hours PRN Temp greater or equal to 38C (100.4F), Mild Pain (1 - 3)  aluminum hydroxide/magnesium hydroxide/simethicone Suspension 30 milliLiter(s) Oral every 4 hours PRN Dyspepsia  melatonin 3 milliGRAM(s) Oral at bedtime PRN Insomnia  ondansetron Injectable 4 milliGRAM(s) IV Push every 8 hours PRN Nausea and/or Vomiting      Allergies    No Known Allergies    Intolerances        REVIEW OF SYSTEMS: all negative with exception of above    Vital Signs Last 24 Hrs  T(C): 37 (18 Jun 2025 12:47), Max: 37.2 (17 Jun 2025 21:24)  T(F): 98.6 (18 Jun 2025 12:47), Max: 98.9 (17 Jun 2025 21:24)  HR: 98 (18 Jun 2025 12:47) (75 - 108)  BP: 144/73 (18 Jun 2025 12:47) (144/73 - 182/88)  BP(mean): --  RR: 16 (18 Jun 2025 12:47) (15 - 19)  SpO2: 97% (18 Jun 2025 12:47) (96% - 98%)    Parameters below as of 18 Jun 2025 08:53  Patient On (Oxygen Delivery Method): room air    PHYSICAL EXAM:  GENERAL: NAD, well-groomed  NERVOUS SYSTEM:  Alert & Oriented X3, Good concentration; Motor Strength 5/5 B/L upper and lower extremities; DTRs 2+ intact and symmetric  CHEST/LUNG: Clear to percussion bilaterally; No rales, rhonchi, wheezing, or rubs  HEART: Regular rate and rhythm; No murmurs, rubs, or gallops  ABDOMEN: Soft, Nontender, Nondistended; Bowel sounds present  EXTREMITIES:  2+ Peripheral Pulses, No clubbing, cyanosis, or edema    LABS:                        16.4   9.52  )-----------( 133      ( 17 Jun 2025 17:05 )             46.4     06-18    142  |  111[H]  |  17  ----------------------------<  97  4.0   |  28  |  1.37[H]    Ca    8.9      18 Jun 2025 08:26    TPro  8.0  /  Alb  3.8  /  TBili  0.7  /  DBili  x   /  AST  23  /  ALT  21  /  AlkPhos  74  06-17    PT/INR - ( 17 Jun 2025 17:05 )   PT: 11.5 sec;   INR: 0.99 ratio         PTT - ( 17 Jun 2025 17:05 )  PTT:27.8 sec  Urinalysis Basic - ( 18 Jun 2025 08:26 )    Color: x / Appearance: x / SG: x / pH: x  Gluc: 97 mg/dL / Ketone: x  / Bili: x / Urobili: x   Blood: x / Protein: x / Nitrite: x   Leuk Esterase: x / RBC: x / WBC x   Sq Epi: x / Non Sq Epi: x / Bacteria: x      CAPILLARY BLOOD GLUCOSE      POCT Blood Glucose.: 91 mg/dL (17 Jun 2025 16:17)      RADIOLOGY & ADDITIONAL TESTS:    Imaging Personally Reviewed:  [ ] YES  [ ] NO  < from: MR Head No Cont (06.18.25 @ 12:11) >  IMPRESSION:  No acute intracranial hemorrhage or acute infarct.    --- End of Report ---            KENDELL COPE MD  This document has been electronically signed. Jun 18 2025 12:29PM    < end of copied text >        Consultant(s) Notes Reviewed:  [ ] YES  [ ] NO    Care Discussed with Consultants/Other Providers [ ] YES  [ ] NO

## 2025-06-18 NOTE — PHYSICAL THERAPY INITIAL EVALUATION ADULT - GENERAL OBSERVATIONS, REHAB EVAL
Pt found semisupine in bed +Lind + telemetry. OT Felipe present. Denies pain and visual impairments. Language Line  used for Jr Myers ( Glenn #[id number]).  Pt integrates both sides of her body and all extremities without difficulties. Pt makes needs/wants known and speech is coherent. Pt found semisupine in bed +Lind + telemetry. OT Felipe present. Denies pain and visual impairments. Language Line  used for Jr Myers ( Glenn #[id number]).  Pt integrates both sides of his body and all extremities without difficulties. Pt makes needs/wants known and speech is coherent.

## 2025-06-18 NOTE — PHYSICAL THERAPY INITIAL EVALUATION ADULT - LEVEL OF INDEPENDENCE: SUPINE/SIT, REHAB EVAL
supervision
BIBEMS from group home intubated, pt found unresponsive, hypotensive, vomiting coffee ground emesis.

## 2025-06-18 NOTE — CONSULT NOTE ADULT - ASSESSMENT
A/P-  85 year old male with PMHx of HTN and urinary retention (with indwelling Parker catheter) who presented to the ED on 6/17/25 for complaints of passing out and inability to speak.     Patient was recently admitted to [facility] for left eye blurry vision, headache, and elevated blood pressure x 9 days. At that time, CT brain without acute intracranial findings. CTA H/N without LVO or stenosis. TTE and MRI brain unremarkable. Evaluated by cardiology for elevated blood pressure who recommended addition of HCTZ. Course complicated by acute urinary retention requiring Parker catheter placement. Planned to follow up with urology for voiding trial. Discharged this morning. When he returned home, family came over to assist him in the bathroom. Patient yelled out for help and passed out. Did not fall as family caught him. Then regained consciousness and did not speak but had eyes open. Does not recall episode. No tongue biting, urinary or bowel incontinence. Baseline functional status is ambulates unassisted and independent with all ADLs. Lives at home with daughter and son.    In the ED, VSS except HR as elevated as 108 and BP as elevated as 174/86. Plts 133K, Cr 1.53. U/A (obtained from old Parker) with trace leuks, negative nitrites, moderate blood, WBC 15, RBC 20, moderate bacteria, squamous epithelial cells present. U/A (obtained from new Parker) with proteinuria, small leuks, negative nitrites, large blood, WBC 10, RBC 35, many bacteria, squamous epithelial cells present. CT head reported  without acute intracranial findings. CTA head and neck reported  without hemodynamically significant stenosis. CT brain perfusion demonstrates delayed mean transit time in the medial left frontal lobe with mismatched volume of 5 mL. Ischemic penumbra not definitely excluded. Received 1L NS bolus. Requested ER physician to contact neurology, who recommends MRI brain. (17 Jun 2025 23:04)      Afebrile  no leukocytosis  urinary retention with parker cath since recent hospitalization  UA- pos for LE and bacteria but negative for nitrates  cx-pending    Impression  possible syncopal episode   chronic indwelling catheter r/o CAUTI  HTN      Plan-  follow urine cx.  no objection to current ceftriaxone that was initiated by medicine team pending cx result.  advise short course  may need  eval  neurology on the case and all brain imaging including  MRI  reported negative for acute hemorrhage or infarct  rest of the medical management as per medicine team.    All labs and imaging and chart notes reviewed.     All above discussed with patient and patient verbalizes full understanding of all above and agrees with above plan of care.    Thank you for this consultation.    Jil Gay MD  Infectious Disease Attending    for any questions please do not hesitate to contact me either via teams or by calling [phone]

## 2025-06-18 NOTE — PHYSICAL THERAPY INITIAL EVALUATION ADULT - PERTINENT HX OF CURRENT PROBLEM, REHAB EVAL
Saintony Guerrier is an 85 year old male with PMHx of HTN and urinary retention (with indwelling Lind catheter) who presented to the ED on 6/17/25 for complaints of passing out and inability to speak.    Colombian Creole Hayes ID #[id number] utilized. Patient was recently admitted to [facility] for left eye blurry vision, headache, and elevated blood pressure x 9 days. At that time, CT brain without acute intracranial findings. CTA H/N without LVO or stenosis. TTE and MRI brain unremarkable. Evaluated by cardiology for elevated blood pressure who recommended addition of HCTZ. Course complicated by acute urinary retention requiring Lind catheter placement. Planned to follow up with urology for voiding trial. Discharged this morning. When he returned home, family came over to assist him in the bathroom. Patient yelled out for help and passed out. Did not fall as family caught him. Then regained consciousness and did not speak but had eyes open. Does not recall episode. No tongue biting, urinary or bowel incontinence. Baseline functional status is ambulates unassisted and independent with all ADLs. Lives at home with daughter and son.

## 2025-06-18 NOTE — PHYSICAL THERAPY INITIAL EVALUATION ADULT - TRANSFER TRAINING, PT EVAL
Independent in  transfer ability bed to chair and vice versa without  assistive device  and prevent falls.

## 2025-06-18 NOTE — OCCUPATIONAL THERAPY INITIAL EVALUATION ADULT - GENERAL OBSERVATIONS, REHAB EVAL
Pt was encountered supine in bed; NAD, portable telemetry +, AXOX4, followed commands, cooperative; pt had no c/o pain. PT Joe present. Pt is Haitain Creole speaking and requires meets  service. OT/PT spoke with Glenn ID: [id number].

## 2025-06-18 NOTE — CONSULT NOTE ADULT - ASSESSMENT
85 year old male with PMHx of HTN and urinary retention (with indwelling Lind catheter) who presented to the ED on 6/17/25 for complaints of passing out and inability to speak. No tongue bite, incontinence   Recent admission to [facility]  for L eye blurry vision, HA and elevted BP  HCTZ recently added at [facility]   CT neg, CTA no stenosis  CTP with perfusion deficit in frontal lobe   Labs with elevated WBC, + UA, GAMA    Syncope - unclear if related to addition of HCTZ vs infectious vs metabolix (GAMA), would r/o seizure due to L frontal lobe perfusion deficit and transient aphasia    - check MRI brain without contrast  - routine eeg  - seizure and fall precuations  - tte  - consider d/c HCTZ for alt agent (non diuretic)  - gradual normotension  - check orthostatics  - pt/ot  - dvt ppx

## 2025-06-18 NOTE — OCCUPATIONAL THERAPY INITIAL EVALUATION ADULT - RANGE OF MOTION EXAMINATION, UPPER EXTREMITY
Grossly./bilateral UE Active ROM was WFL  (within functional limits) Xeljanz Counseling: I discussed with the patient the risks of Xeljanz therapy including increased risk of infection, liver issues, headache, diarrhea, or cold symptoms. Live vaccines should be avoided. They were instructed to call if they have any problems.

## 2025-06-18 NOTE — CONSULT NOTE ADULT - SUBJECTIVE AND OBJECTIVE BOX
Neurology Consult    Reason for Consult: Patient is a 85y old  Male who presents with a chief complaint of Syncope vs. TIA vs. CVA (17 Jun 2025 23:04)      HPI:  Saintony Guerrier is an 85 year old male with PMHx of HTN and urinary retention (with indwelling Lind catheter) who presented to the ED on 6/17/25 for complaints of passing out and inability to speak.    [place] Creole Hayes ID #[id number] utilized. Patient was recently admitted to [facility] for left eye blurry vision, headache, and elevated blood pressure x 9 days. At that time, CT brain without acute intracranial findings. CTA H/N without LVO or stenosis. TTE and MRI brain unremarkable. Evaluated by cardiology for elevated blood pressure who recommended addition of HCTZ. Course complicated by acute urinary retention requiring Lind catheter placement. Planned to follow up with urology for voiding trial. Discharged this morning. When he returned home, family came over to assist him in the bathroom. Patient yelled out for help and passed out. Did not fall as family caught him. Then regained consciousness and did not speak but had eyes open. Does not recall episode. No tongue biting, urinary or bowel incontinence. Baseline functional status is ambulates unassisted and independent with all ADLs. Lives at home with daughter and son.    In the ED, VSS except HR as elevated as 108 and BP as elevated as 174/86. Plts 133K, Cr 1.53. U/A (obtained from old Lind) with trace leuks, negative nitrites, moderate blood, WBC 15, RBC 20, moderate bacteria, squamous epithelial cells present. U/A (obtained from new Lind) with proteinuria, small leuks, negative nitrites, large blood, WBC 10, RBC 35, many bacteria, squamous epithelial cells present. CT head without acute intracranial findings. CTA head and neck without hemodynamically significant stenosis. CT brain perfusion demonstrates delayed mean transit time in the medial left frontal lobe with mismatched volume of 5 mL. Ischemic penumbra not definitely excluded. Received 1L NS bolus. Requested ER physician to contact neurology, who recommends MRI brain. (17 Jun 2025 23:04)       PAST MEDICAL & SURGICAL HISTORY:  HTN (hypertension)      Indwelling Lind catheter present          Allergies: Allergies    No Known Allergies    Intolerances        Social History: Denies toxic habits including tobacco, ETOH or illicit drugs.    Family History: FAMILY HISTORY:  . No family history of strokes    Medications: MEDICATIONS  (STANDING):  aspirin  chewable 81 milliGRAM(s) Oral daily  atorvastatin 40 milliGRAM(s) Oral at bedtime  cefTRIAXone   IVPB 1000 milliGRAM(s) IV Intermittent every 24 hours    MEDICATIONS  (PRN):  acetaminophen     Tablet .. 650 milliGRAM(s) Oral every 6 hours PRN Temp greater or equal to 38C (100.4F), Mild Pain (1 - 3)  aluminum hydroxide/magnesium hydroxide/simethicone Suspension 30 milliLiter(s) Oral every 4 hours PRN Dyspepsia  melatonin 3 milliGRAM(s) Oral at bedtime PRN Insomnia  ondansetron Injectable 4 milliGRAM(s) IV Push every 8 hours PRN Nausea and/or Vomiting      Review of Systems:  unable to assess    Vitals:  Vital Signs Last 24 Hrs  T(C): 36.8 (18 Jun 2025 08:53), Max: 37.2 (17 Jun 2025 21:24)  T(F): 98.3 (18 Jun 2025 08:53), Max: 98.9 (17 Jun 2025 21:24)  HR: 77 (18 Jun 2025 08:53) (75 - 108)  BP: 150/71 (18 Jun 2025 08:53) (150/71 - 182/88)  BP(mean): --  RR: 16 (18 Jun 2025 08:53) (15 - 19)  SpO2: 96% (18 Jun 2025 08:53) (96% - 98%)    Parameters below as of 18 Jun 2025 08:53  Patient On (Oxygen Delivery Method): room air        General Exam:   General Appearance: Appropriately dressed and in no acute distress       Head: Normocephalic, atraumatic and no dysmorphic features  Ear, Nose, and Throat: Moist mucous membranes  CVS: S1S2+  Resp: No SOB, no wheeze or rhonchi  GI: soft NT/ND  Extremities: No edema or cyanosis  Skin: No bruises or rashes     Neurological Exam:  Mental Status: Awake, alert and oriented x 2.  Able to follow simple commands. Speech fluent, no dysarthria   Cranial Nerves: PERRL, EOMI, VFFC, sensation V1-V3 intact,  no obvious facial asymmetry, equal elevation of palate, scm/trap 5/5, tongue is midline on protrusion. hearing is grossly intact.   Motor: Normal bulk, tone and strength throughout. Fine finger movements were intact and symmetric. no tremors or drift noted.    Sensation: Intact to light touch   Reflexes: 1+ throughout at biceps, brachioradialis, triceps, patellars and ankles bilaterally and equal. No clonus. R toe and L toe were both downgoing.  Coordination: No dysmetria on FNF  Gait: deferred    Data/Labs/Imaging which I personally reviewed.     Labs:     CBC Full  -  ( 17 Jun 2025 17:05 )  WBC Count : 9.52 K/uL  RBC Count : 6.07 M/uL  Hemoglobin : 16.4 g/dL  Hematocrit : 46.4 %  Platelet Count - Automated : 133 K/uL  Mean Cell Volume : 76.4 fl  Mean Cell Hemoglobin : 27.0 pg  Mean Cell Hemoglobin Concentration : 35.3 g/dL  Auto Neutrophil # : x  Auto Lymphocyte # : x  Auto Monocyte # : x  Auto Eosinophil # : x  Auto Basophil # : x  Auto Neutrophil % : x  Auto Lymphocyte % : x  Auto Monocyte % : x  Auto Eosinophil % : x  Auto Basophil % : x    06-18c    142  |  111[H]  |  17  ----------------------------<  97  4.0   |  28  |  1.37[H]    Ca    8.9      18 Jun 2025 08:26    TPro  8.0  /  Alb  3.8  /  TBili  0.7  /  DBili  x   /  AST  23  /  ALT  21  /  AlkPhos  74  06-17    LIVER FUNCTIONS - ( 17 Jun 2025 17:05 )  Alb: 3.8 g/dL / Pro: 8.0 gm/dL / ALK PHOS: 74 U/L / ALT: 21 U/L / AST: 23 U/L / GGT: x           PT/INR - ( 17 Jun 2025 17:05 )   PT: 11.5 sec;   INR: 0.99 ratio         PTT - ( 17 Jun 2025 17:05 )  PTT:27.8 sec  Urinalysis Basic - ( 18 Jun 2025 08:26 )    Color: x / Appearance: x / SG: x / pH: x  Gluc: 97 mg/dL / Ketone: x  / Bili: x / Urobili: x   Blood: x / Protein: x / Nitrite: x   Leuk Esterase: x / RBC: x / WBC x   Sq Epi: x / Non Sq Epi: x / Bacteria: x        Urinalysis with Rflx Culture (collected 06-17-25 @ 17:40)    < from: CT Brain Stroke Protocol (06.17.25 @ 16:37) >    ACC: [id number] EXAM:  CT ANGIO BRAIN STROKE PROTC IC   ORDERED BY:   SHAWN AUGUSTE     ACC: [id number] EXAM:  CT ANGIO NECK STROKE PROTCL IC   ORDERED BY:   SHAWN AUGUSTE     ACC: [id number] EXAM:  CT BRAIN STROKE PROTOCOL   ORDERED BY: SHAWN AUGUSTE     ACC: [id number] EXAM:  CT BRAIN PERFUSION MAPS STROKE   ORDERED BY:   SHAWN AUGUSTE     PROCEDURE DATE:  06/17/2025          INTERPRETATION:  CLINICAL STATEMENT: Stroke protocol.    TECHNIQUE: Stroke protocol.  CTA brain and neck. CT perfusion: After the   administration of 50 cc of Omnipaque 300 serial thin sections were   obtained through the brain the purposes of evaluating CT perfusion. Raw   data was sent to the ischemia rapid view software for postprocessing. 90   cc Omnipaque 350 Intravenous contrast was administered for the CTA   compartment and 10 cc discarded. 2-D MIP and 3-D volume rendering images.    COMPARISON: None.    FINDINGS:  CT Head:  There is moderate diffuse parenchymal volume loss.    There are areas of low attenuation in the periventricular white matter   likely related to mild chronic microvascular ischemic changes.    There is no acute intracranial hemorrhage, parenchymal mass, mass effect   or midline shift. There is no acute territorial infarct. There is no   hydrocephalus.    The cranium is intact. The visualized paranasal sinuses are well-aerated.      CTA of the neck:      The common carotid and internal carotid arteries are patent.    The extracranial vertebral arteries are patent. Dominant left vertebral   artery noted    Degenerative changes noted    CTA Head:  Calcified atherosclerotic plaques noted in the cavernous and supraclinoid   internal carotid arteries    The proximal anterior, middle and posterior cerebral arteries are patent.    The intracranial vertebral and basilar arteries are patent.    There is no intracranial aneurysm.        CT perfusion:  No core infarct.    Delayed mean transit time noted in the medial left frontal lobe.    CBF<30% volume: 0 ml  Tmax>6.0 s volume: 5 ml  Mismatch volume: 5 ml  Mismatch ratio: Infinite          IMPRESSION:  No acute intracranial hemorrhage or acute territorial infarct. If acute   ischemia is a strong clinical concern, MRI exam is recommended for   further evaluation if there is no contraindication. Dr. Auguste notified   6/17/2025 at 4:54 PM    CT perfusion demonstrates delayed mean transit time in the medial left   frontal lobe with mismatched volume of 5 mL. Ischemic penumbra not   definitely excluded. MRI exam recommended.    No hemodynamically significant stenosis    --- End of Report ---            KENDELL COPE MD  This document has been electronically signed. Jun 17 2025  5:15PM    < end of copied text >

## 2025-06-18 NOTE — OCCUPATIONAL THERAPY INITIAL EVALUATION ADULT - PERTINENT HX OF CURRENT PROBLEM, REHAB EVAL
As per H&P; Saintony Guerrier is an 85 year old male with PMHx of HTN and urinary retention (with indwelling Lind catheter) who presented to the ED on 6/17/25 for complaints of passing out and inability to speak.    Robyn Myers Hayes ID #[id number] utilized. Patient was recently admitted to [facility] for left eye blurry vision, headache, and elevated blood pressure x 9 days. At that time, CT brain without acute intracranial findings. CTA H/N without LVO or stenosis. TTE and MRI brain unremarkable. Evaluated by cardiology for elevated blood pressure who recommended addition of HCTZ. Course complicated by acute urinary retention requiring Lind catheter placement. Planned to follow up with urology for voiding trial. Discharged this morning. When he returned home, family came over to assist him in the bathroom. Patient yelled out for help and passed out. Did not fall as family caught him. Then regained consciousness and did not speak but had eyes open. Does not recall episode. No tongue biting, urinary or bowel incontinence. Baseline functional status is ambulates unassisted and independent with all ADLs. Lives at home with daughter and son.    In the ED, VSS except HR as elevated as 108 and BP as elevated as 174/86. Plts 133K, Cr 1.53. U/A (obtained from old Lind) with trace leuks, negative nitrites, moderate blood, WBC 15, RBC 20, moderate bacteria, squamous epithelial cells present. U/A (obtained from new Lind) with proteinuria, small leuks, negative nitrites, large blood, WBC 10, RBC 35, many bacteria, squamous epithelial cells present. CT head without acute intracranial findings. CTA head and neck without hemodynamically significant stenosis. CT brain perfusion demonstrates delayed mean transit time in the medial left frontal lobe with mismatched volume of 5 mL. Ischemic penumbra not definitely excluded. Received 1L NS bolus. Requested ER physician to contact neurology, who recommends MRI brain.

## 2025-06-18 NOTE — CONSULT NOTE ADULT - SUBJECTIVE AND OBJECTIVE BOX
HPI:  Saintony Guerrier is an 85 year old male with PMHx of HTN and urinary retention (with indwelling Parker catheter) who presented to the ED on 6/17/25 for complaints of passing out and inability to speak.    Bel[place]ian Creole Hayes ID #[id number] utilized. Patient was recently admitted to [facility] for left eye blurry vision, headache, and elevated blood pressure x 9 days. At that time, CT brain without acute intracranial findings. CTA H/N without LVO or stenosis. TTE and MRI brain unremarkable. Evaluated by cardiology for elevated blood pressure who recommended addition of HCTZ. Course complicated by acute urinary retention requiring Parker catheter placement. Planned to follow up with urology for voiding trial. Discharged this morning. When he returned home, family came over to assist him in the bathroom. Patient yelled out for help and passed out. Did not fall as family caught him. Then regained consciousness and did not speak but had eyes open. Does not recall episode. No tongue biting, urinary or bowel incontinence. Baseline functional status is ambulates unassisted and independent with all ADLs. Lives at home with daughter and son.    In the ED, VSS except HR as elevated as 108 and BP as elevated as 174/86. Plts 133K, Cr 1.53. U/A (obtained from old Parker) with trace leuks, negative nitrites, moderate blood, WBC 15, RBC 20, moderate bacteria, squamous epithelial cells present. U/A (obtained from new Parker) with proteinuria, small leuks, negative nitrites, large blood, WBC 10, RBC 35, many bacteria, squamous epithelial cells present. CT head without acute intracranial findings. CTA head and neck without hemodynamically significant stenosis. CT brain perfusion demonstrates delayed mean transit time in the medial left frontal lobe with mismatched volume of 5 mL. Ischemic penumbra not definitely excluded. Received 1L NS bolus. Requested ER physician to contact neurology, who recommends MRI brain. (17 Jun 2025 23:04)      PAST MEDICAL & SURGICAL HISTORY:  HTN (hypertension)      Indwelling Parker catheter present          Allergies    No Known Allergies    Intolerances        ANTIMICROBIALS:  cefTRIAXone   IVPB 1000 every 24 hours      OTHER MEDS:  acetaminophen     Tablet .. 650 milliGRAM(s) Oral every 6 hours PRN  aluminum hydroxide/magnesium hydroxide/simethicone Suspension 30 milliLiter(s) Oral every 4 hours PRN  aspirin  chewable 81 milliGRAM(s) Oral daily  atorvastatin 40 milliGRAM(s) Oral at bedtime  melatonin 3 milliGRAM(s) Oral at bedtime PRN  ondansetron Injectable 4 milliGRAM(s) IV Push every 8 hours PRN      SOCIAL HISTORY:    Marital Status:    Occupation:   Lives with:     Substance Use (street drugs):   Tobacco Usage:    Alcohol Usage: Social EtOH    FAMILY HISTORY:      ROS:  Unobtainable because:   All other systems negative     Constitutional: no fever, no chills, no weight loss, no night sweats  Eye: no eye pain, no redness, no vision changes  ENT:  no sore throat, no rhinorrhea  Cardiovascular:  no chest pain, no palpitation  Respiratory:  no SOB, no cough  GI:  no abd pain, no vomiting, no diarrhea  urinary: no dysuria, no hematuria, no flank pain  : no  discharge or bleeding  musculoskeletal:  no joint pain, no joint swelling  skin:  no rash  neurology:  no headache, no seizure, no change in mental status  psych: no anxiety, no depression     Physical Exam:    General:    NAD, non toxic  Head: atraumatic, normocephalic  Eyes: normal sclera and conjunctiva  ENT:   no oropharyngeal lesions, no LAD, neck supple  Cardio:    regular S1,S2, no murmur  Respiratory:   clear to auscultation b/l, no wheezing  abd:   soft, BS +, not tender, no hepatosplenomegaly  :     no CVAT, no suprapubic tenderness, no parker  Musculoskeletal : no joint swelling, no edema  Skin:    no rash  vascular:  normal pulses  Neurologic:     no focal deficits  psych: normal affect, no suicidal ideation      Drug Dosing Weight  Height (cm): 190.5 (17 Jun 2025 16:25)  Weight (kg): 77.8 (17 Jun 2025 21:24)  BMI (kg/m2): 21.4 (17 Jun 2025 21:24)  BSA (m2): 2.06 (17 Jun 2025 21:24)    Vital Signs Last 24 Hrs  T(F): 98.3 (06-18-25 @ 08:53), Max: 98.9 (06-17-25 @ 21:24)    Vital Signs Last 24 Hrs  HR: 77 (06-18-25 @ 08:53) (75 - 108)  BP: 150/71 (06-18-25 @ 08:53) (150/71 - 182/88)  RR: 16 (06-18-25 @ 08:53)  SpO2: 96% (06-18-25 @ 08:53) (96% - 98%)  Wt(kg): --                          16.4   9.52  )-----------( 133      ( 17 Jun 2025 17:05 )             46.4       06-18    142  |  111[H]  |  17  ----------------------------<  97  4.0   |  28  |  1.37[H]    Ca    8.9      18 Jun 2025 08:26    TPro  8.0  /  Alb  3.8  /  TBili  0.7  /  DBili  x   /  AST  23  /  ALT  21  /  AlkPhos  74  06-17      Urinalysis Basic - ( 18 Jun 2025 08:26 )    Color: x / Appearance: x / SG: x / pH: x  Gluc: 97 mg/dL / Ketone: x  / Bili: x / Urobili: x   Blood: x / Protein: x / Nitrite: x   Leuk Esterase: x / RBC: x / WBC x   Sq Epi: x / Non Sq Epi: x / Bacteria: x        MICROBIOLOGY:  v              RADIOLOGY:       History obtained mostly from the medical chart and also his family at bedside helped translate    HPI:  Patient is a  85 year old male with PMHx of HTN and urinary retention (with indwelling Parker catheter) who presented to the ED on 6/17/25 for complaints of passing out and inability to speak.     Patient was recently admitted to [facility] for left eye blurry vision, headache, and elevated blood pressure x 9 days. At that time, CT brain without acute intracranial findings. CTA H/N without LVO or stenosis. TTE and MRI brain unremarkable. Evaluated by cardiology for elevated blood pressure who recommended addition of HCTZ. Course complicated by acute urinary retention requiring Parker catheter placement. Planned to follow up with urology for voiding trial. Discharged this morning. When he returned home, family came over to assist him in the bathroom. Patient yelled out for help and passed out. Did not fall as family caught him. Then regained consciousness and did not speak but had eyes open. Does not recall episode. No tongue biting, urinary or bowel incontinence. Baseline functional status is ambulates unassisted and independent with all ADLs. Lives at home with daughter and son.    In the ED, VSS except HR as elevated as 108 and BP as elevated as 174/86. Plts 133K, Cr 1.53. U/A (obtained from old Parker) with trace leuks, negative nitrites, moderate blood, WBC 15, RBC 20, moderate bacteria, squamous epithelial cells present. U/A (obtained from new Parker) with proteinuria, small leuks, negative nitrites, large blood, WBC 10, RBC 35, many bacteria, squamous epithelial cells present. CT head reported  without acute intracranial findings. CTA head and neck reported  without hemodynamically significant stenosis. CT brain perfusion demonstrates delayed mean transit time in the medial left frontal lobe with mismatched volume of 5 mL. Ischemic penumbra not definitely excluded. Received 1L NS bolus. Requested ER physician to contact neurology, who recommends MRI brain. (17 Jun 2025 23:04)    Infectious Disease consult requested today 6/18 to help with abx management for ? UTI    patient seen and examined   his family at his bedside  patient sitting in chair  awake, alert  denies any fever or chills  denies nay N/v or any diarrhea  denies any abd or back pain        PAST MEDICAL & SURGICAL HISTORY:  HTN (hypertension)    Allergies    No Known drug Allergies          ANTIMICROBIALS:  cefTRIAXone   IVPB 1000 every 24 hours      OTHER MEDS:  acetaminophen     Tablet .. 650 milliGRAM(s) Oral every 6 hours PRN  aluminum hydroxide/magnesium hydroxide/simethicone Suspension 30 milliLiter(s) Oral every 4 hours PRN  aspirin  chewable 81 milliGRAM(s) Oral daily  atorvastatin 40 milliGRAM(s) Oral at bedtime  melatonin 3 milliGRAM(s) Oral at bedtime PRN  ondansetron Injectable 4 milliGRAM(s) IV Push every 8 hours PRN      SOCIAL HISTORY:    Lives with: family    no known toxic habits      ROS:    All other systems negative     Constitutional: no fever, no chills, no weight loss, no night sweats  Eye: no eye pain, no redness, no vision changes  ENT:  no sore throat, no rhinorrhea  Cardiovascular:  no chest pain, no palpitation  Respiratory:  no SOB, no cough  GI:  no abd pain, no vomiting, no diarrhea  urinary: no dysuria, no hematuria, no flank pain  : no  discharge or bleeding  musculoskeletal:  no joint pain, no joint swelling  skin:  no rash  neurology:  no headache    Physical Exam:    General:    NAD, non toxic  Head: atraumatic, normocephalic  Eyes: normal sclera and conjunctiva  ENT:   no oropharyngeal lesions, no LAD, neck supple  Cardio:    regular S1,S2  Respiratory:   clear to auscultation b/l, no wheezing  abd:   soft, BS +, not tender, no distention  :     no CVAT, no suprapubic tenderness, + parker  Musculoskeletal : no joint swelling, no edema  Skin:    no rash  vascular:  normal pulses  Neurologic:     no focal deficits  psych: normal affect,       Drug Dosing Weight  Height (cm): 190.5 (17 Jun 2025 16:25)  Weight (kg): 77.8 (17 Jun 2025 21:24)  BMI (kg/m2): 21.4 (17 Jun 2025 21:24)  BSA (m2): 2.06 (17 Jun 2025 21:24)    Vital Signs Last 24 Hrs  T(F): 98.3 (06-18-25 @ 08:53), Max: 98.9 (06-17-25 @ 21:24)    Vital Signs Last 24 Hrs  HR: 77 (06-18-25 @ 08:53) (75 - 108)  BP: 150/71 (06-18-25 @ 08:53) (150/71 - 182/88)  RR: 16 (06-18-25 @ 08:53)  SpO2: 96% (06-18-25 @ 08:53) (96% - 98%)  Wt(kg): --                          16.4   9.52  )-----------( 133      ( 17 Jun 2025 17:05 )             46.4       06-18    142  |  111[H]  |  17  ----------------------------<  97  4.0   |  28  |  1.37[H]    Ca    8.9      18 Jun 2025 08:26    TPro  8.0  /  Alb  3.8  /  TBili  0.7  /  DBili  x   /  AST  23  /  ALT  21  /  AlkPhos  74  06-17      Urinalysis Basic - ( 18 Jun 2025 08:26 )    Color: x / Appearance: x / SG: x / pH: x  Gluc: 97 mg/dL / Ketone: x  / Bili: x / Urobili: x   Blood: x / Protein: x / Nitrite: x   Leuk Esterase: x / RBC: x / WBC x   Sq Epi: x / Non Sq Epi: x / Bacteria: x        MICROBIOLOGY:    RADIOLOGY:  < from: CT Brain Perfusion Maps Stroke (06.17.25 @ 16:42) >  IMPRESSION:  No acute intracranial hemorrhage or acute territorial infarct. If acute   ischemia is a strong clinical concern, MRI exam is recommended for   further evaluation if there is no contraindication. Dr. Auguste notified   6/17/2025 at 4:54 PM    CT perfusion demonstrates delayed mean transit time in the medial left   frontal lobe with mismatched volume of 5 mL. Ischemic penumbra not   definitely excluded. MRI exam recommended.    No hemodynamically significant stenosis    --- End of Report ---            KENDELL COPE MD  This document has been electronically signed. Jun 17 2025  5:15PM    < end of copied text >      < from: MR Head No Cont (06.18.25 @ 12:11) >  ACC: [id number] EXAM:  MR BRAIN   ORDERED BY: CHAPITO HALEY DATE:  06/18/2025          INTERPRETATION:  CLINICAL STATEMENT: Pain.    TECHNIQUE: MRI of the brain without gadolinium.    COMPARISON: CT head 6/17/2025    FINDINGS:  There is moderate diffuse parenchymal volume loss.    There are nonspecific T2 prolongation signal abnormalities in the clarita,   periventricular subcortical white matter likely related to moderate   chronic microvascular ischemic changes.    There is no acute parenchymal hemorrhage, parenchymal mass, mass effect   or midline shift. There is no extra-axial fluid collection.  There is no   hydrocephalus.  There is no acute infarct.    Retention cyst/polyp left maxillary sinus. Partial opacification left   mastoid air cells.    IMPRESSION:  No acute intracranial hemorrhage or acute infarct.    --- End of Report ---            KENDELL COPE MD  This document has been electronically signed. Jun 18 2025 12:29PM    < end of copied text >

## 2025-06-19 LAB
ALBUMIN SERPL ELPH-MCNC: 3.2 G/DL — LOW (ref 3.3–5)
ALP SERPL-CCNC: 61 U/L — SIGNIFICANT CHANGE UP (ref 40–120)
ALT FLD-CCNC: 22 U/L — SIGNIFICANT CHANGE UP (ref 12–78)
ANION GAP SERPL CALC-SCNC: 6 MMOL/L — SIGNIFICANT CHANGE UP (ref 5–17)
AST SERPL-CCNC: 18 U/L — SIGNIFICANT CHANGE UP (ref 15–37)
BILIRUB SERPL-MCNC: 0.7 MG/DL — SIGNIFICANT CHANGE UP (ref 0.2–1.2)
BUN SERPL-MCNC: 19 MG/DL — SIGNIFICANT CHANGE UP (ref 7–23)
CALCIUM SERPL-MCNC: 9 MG/DL — SIGNIFICANT CHANGE UP (ref 8.5–10.1)
CHLORIDE SERPL-SCNC: 112 MMOL/L — HIGH (ref 96–108)
CO2 SERPL-SCNC: 25 MMOL/L — SIGNIFICANT CHANGE UP (ref 22–31)
CREAT SERPL-MCNC: 1.26 MG/DL — SIGNIFICANT CHANGE UP (ref 0.5–1.3)
CULTURE RESULTS: NO GROWTH — SIGNIFICANT CHANGE UP
EGFR: 55 ML/MIN/1.73M2 — LOW
EGFR: 55 ML/MIN/1.73M2 — LOW
GLUCOSE SERPL-MCNC: 105 MG/DL — HIGH (ref 70–99)
HCT VFR BLD CALC: 38.2 % — LOW (ref 39–50)
HGB BLD-MCNC: 13.4 G/DL — SIGNIFICANT CHANGE UP (ref 13–17)
MCHC RBC-ENTMCNC: 27.1 PG — SIGNIFICANT CHANGE UP (ref 27–34)
MCHC RBC-ENTMCNC: 35.1 G/DL — SIGNIFICANT CHANGE UP (ref 32–36)
MCV RBC AUTO: 77.2 FL — LOW (ref 80–100)
NRBC BLD AUTO-RTO: 0 /100 WBCS — SIGNIFICANT CHANGE UP (ref 0–0)
PLATELET # BLD AUTO: 111 K/UL — LOW (ref 150–400)
POTASSIUM SERPL-MCNC: 4.2 MMOL/L — SIGNIFICANT CHANGE UP (ref 3.5–5.3)
POTASSIUM SERPL-SCNC: 4.2 MMOL/L — SIGNIFICANT CHANGE UP (ref 3.5–5.3)
PROT SERPL-MCNC: 6.7 GM/DL — SIGNIFICANT CHANGE UP (ref 6–8.3)
RBC # BLD: 4.95 M/UL — SIGNIFICANT CHANGE UP (ref 4.2–5.8)
RBC # FLD: 14.7 % — HIGH (ref 10.3–14.5)
SODIUM SERPL-SCNC: 143 MMOL/L — SIGNIFICANT CHANGE UP (ref 135–145)
SPECIMEN SOURCE: SIGNIFICANT CHANGE UP
WBC # BLD: 6.09 K/UL — SIGNIFICANT CHANGE UP (ref 3.8–10.5)
WBC # FLD AUTO: 6.09 K/UL — SIGNIFICANT CHANGE UP (ref 3.8–10.5)

## 2025-06-19 PROCEDURE — 99223 1ST HOSP IP/OBS HIGH 75: CPT

## 2025-06-19 PROCEDURE — 99254 IP/OBS CNSLTJ NEW/EST MOD 60: CPT

## 2025-06-19 PROCEDURE — 99222 1ST HOSP IP/OBS MODERATE 55: CPT

## 2025-06-19 PROCEDURE — 99232 SBSQ HOSP IP/OBS MODERATE 35: CPT

## 2025-06-19 PROCEDURE — 99233 SBSQ HOSP IP/OBS HIGH 50: CPT

## 2025-06-19 RX ORDER — POLYETHYLENE GLYCOL 3350 17 G/17G
17 POWDER, FOR SOLUTION ORAL DAILY
Refills: 0 | Status: COMPLETED | OUTPATIENT
Start: 2025-06-19 | End: 2025-06-21

## 2025-06-19 RX ORDER — TAMSULOSIN HYDROCHLORIDE 0.4 MG/1
0.8 CAPSULE ORAL AT BEDTIME
Refills: 0 | Status: DISCONTINUED | OUTPATIENT
Start: 2025-06-19 | End: 2025-06-23

## 2025-06-19 RX ORDER — FINASTERIDE 1 MG/1
5 TABLET, FILM COATED ORAL DAILY
Refills: 0 | Status: DISCONTINUED | OUTPATIENT
Start: 2025-06-19 | End: 2025-06-23

## 2025-06-19 RX ORDER — LISINOPRIL 5 MG/1
40 TABLET ORAL DAILY
Refills: 0 | Status: DISCONTINUED | OUTPATIENT
Start: 2025-06-19 | End: 2025-06-23

## 2025-06-19 RX ORDER — SENNA 187 MG
2 TABLET ORAL AT BEDTIME
Refills: 0 | Status: DISCONTINUED | OUTPATIENT
Start: 2025-06-19 | End: 2025-06-23

## 2025-06-19 RX ORDER — AMLODIPINE BESYLATE 10 MG/1
10 TABLET ORAL DAILY
Refills: 0 | Status: DISCONTINUED | OUTPATIENT
Start: 2025-06-19 | End: 2025-06-23

## 2025-06-19 RX ADMIN — TAMSULOSIN HYDROCHLORIDE 0.8 MILLIGRAM(S): 0.4 CAPSULE ORAL at 21:25

## 2025-06-19 RX ADMIN — LISINOPRIL 40 MILLIGRAM(S): 5 TABLET ORAL at 16:16

## 2025-06-19 RX ADMIN — Medication 2 TABLET(S): at 21:25

## 2025-06-19 RX ADMIN — ATORVASTATIN CALCIUM 40 MILLIGRAM(S): 80 TABLET, FILM COATED ORAL at 21:25

## 2025-06-19 RX ADMIN — CEFTRIAXONE 100 MILLIGRAM(S): 500 INJECTION, POWDER, FOR SOLUTION INTRAMUSCULAR; INTRAVENOUS at 05:11

## 2025-06-19 RX ADMIN — Medication 81 MILLIGRAM(S): at 11:05

## 2025-06-19 RX ADMIN — AMLODIPINE BESYLATE 10 MILLIGRAM(S): 10 TABLET ORAL at 16:17

## 2025-06-19 RX ADMIN — Medication 100 MILLIGRAM(S): at 16:17

## 2025-06-19 NOTE — PROGRESS NOTE ADULT - SUBJECTIVE AND OBJECTIVE BOX
Patient is a 85y old  Male who presents with a chief complaint of Syncope vs. TIA vs. CVA (18 Jun 2025 15:21)    INTERVAL HPI/OVERNIGHT EVENTS: No acute events overnight. HD stable.     MEDICATIONS  (STANDING):  aspirin  chewable 81 milliGRAM(s) Oral daily  atorvastatin 40 milliGRAM(s) Oral at bedtime  cefTRIAXone   IVPB 1000 milliGRAM(s) IV Intermittent every 24 hours    MEDICATIONS  (PRN):  acetaminophen     Tablet .. 650 milliGRAM(s) Oral every 6 hours PRN Temp greater or equal to 38C (100.4F), Mild Pain (1 - 3)  aluminum hydroxide/magnesium hydroxide/simethicone Suspension 30 milliLiter(s) Oral every 4 hours PRN Dyspepsia  melatonin 3 milliGRAM(s) Oral at bedtime PRN Insomnia  ondansetron Injectable 4 milliGRAM(s) IV Push every 8 hours PRN Nausea and/or Vomiting      Allergies    No Known Allergies    Intolerances        REVIEW OF SYSTEMS: all negative with exception of above    Vital Signs Last 24 Hrs  T(C): 36.4 (19 Jun 2025 11:11), Max: 36.9 (18 Jun 2025 16:41)  T(F): 97.6 (19 Jun 2025 11:11), Max: 98.4 (18 Jun 2025 16:41)  HR: 80 (19 Jun 2025 11:11) (68 - 87)  BP: 173/81 (19 Jun 2025 11:11) (161/82 - 173/81)  BP(mean): --  RR: 16 (19 Jun 2025 11:11) (16 - 17)  SpO2: 97% (19 Jun 2025 11:11) (96% - 97%)    Parameters below as of 19 Jun 2025 04:23  Patient On (Oxygen Delivery Method): room air    PHYSICAL EXAM:  GENERAL: NAD, well-groomed  NERVOUS SYSTEM:  Alert & Oriented X3, Good concentration; Motor Strength 5/5 B/L upper and lower extremities; DTRs 2+ intact and symmetric  CHEST/LUNG: Clear to percussion bilaterally; No rales, rhonchi, wheezing, or rubs  HEART: Regular rate and rhythm; No murmurs, rubs, or gallops  ABDOMEN: Soft, Nontender, Nondistended; Bowel sounds present  EXTREMITIES:  2+ Peripheral Pulses, No clubbing, cyanosis, or edema    LABS:                        13.4   6.09  )-----------( 111      ( 19 Jun 2025 05:32 )             38.2     06-19    143  |  112[H]  |  19  ----------------------------<  105[H]  4.2   |  25  |  1.26    Ca    9.0      19 Jun 2025 05:32    TPro  6.7  /  Alb  3.2[L]  /  TBili  0.7  /  DBili  x   /  AST  18  /  ALT  22  /  AlkPhos  61  06-19    PT/INR - ( 17 Jun 2025 17:05 )   PT: 11.5 sec;   INR: 0.99 ratio         PTT - ( 17 Jun 2025 17:05 )  PTT:27.8 sec  Urinalysis Basic - ( 19 Jun 2025 05:32 )    Color: x / Appearance: x / SG: x / pH: x  Gluc: 105 mg/dL / Ketone: x  / Bili: x / Urobili: x   Blood: x / Protein: x / Nitrite: x   Leuk Esterase: x / RBC: x / WBC x   Sq Epi: x / Non Sq Epi: x / Bacteria: x      CAPILLARY BLOOD GLUCOSE          RADIOLOGY & ADDITIONAL TESTS:    Imaging Personally Reviewed:  [ ] YES  [ ] NO    Consultant(s) Notes Reviewed:  [ ] YES  [ ] NO    Care Discussed with Consultants/Other Providers [ ] YES  [ ] NO

## 2025-06-19 NOTE — CONSULT NOTE ADULT - ASSESSMENT
86 Y/O male with a PMHx of HTN and BPH with no PSHx admitted with syncope vs TIA vs CVA. [employer]  Olegario Ellis #[id number] for translation. Patient is poor historian. States that he was recently hospitalized at [facility] for elevated BP, blurred vision and headaches. During his stay, he was suddenly unable to urinate and had a catheter placed on 6/15/2025 and was referred to F/U with Urology as an outpatient to have catheter removed for trial of void. Shortly after being discharged from [facility], he syncopized at home and was admitted at [facility]. States prior to his hospitalization he was able to void without issue. Was seen by his PCP in 2022 and referred to a Urologist for BPH, but never followed up due to lack of transportation.   Afebrile with no leukocytosis. GAMA upon admission now resolved. UCx from 6/17 with NGTD.     PLAN:     ** Full plan to follow  84 Y/O male with a PMHx of HTN and BPH with no PSHx admitted with syncope vs TIA vs CVA. [employer]  Olegario Ellis #[id number] for translation. Patient is poor historian. States that he was recently hospitalized at [facility] for elevated BP, blurred vision and headaches. During his stay, he was suddenly unable to urinate and had a catheter placed on 6/15/2025 and was referred to F/U with Urology as an outpatient to have catheter removed for trial of void. Shortly after being discharged from [facility], he syncopized at home and was admitted at [facility]. States prior to his hospitalization he was able to void without issue. Was seen by his PCP in 2022 and referred to a Urologist for BPH, but never followed up due to lack of transportation.   Afebrile with no leukocytosis. GAMA upon admission now resolved. UCx from 6/17 with NGTD.     PLAN:     - Recommending starting Flomax/Finasteride   - Obtain PSA  - Recommend CTAP non-contrast   - F/U final UCx   - Maintain parker catheter; monitor output quality and quantity   - ABx per ID  - Continue care per primary team    - Discussed with Dr. Durant

## 2025-06-19 NOTE — PROGRESS NOTE ADULT - SUBJECTIVE AND OBJECTIVE BOX
GUERRIER, SAINTONY  MRN-[id number]  85y (1940)    Follow Up:  possible UTI    Interval History: The pt was seen and examined earlier, not in acute distress, no new complaints, awake and alert, appropriate, out of bed to chair, NSLIJ VS video  used # [id number]. Pt is afebrile, RA, no leukocytosis.     PAST MEDICAL & SURGICAL HISTORY:  HTN (hypertension)      Indwelling Parker catheter present          ROS:    [ ] Unobtainable because:  [x ] All other systems negative    Constitutional: no fever, no chills  Head: no trauma  Eyes: no vision changes, no eye pain  ENT:  no sore throat, no rhinorrhea  Cardiovascular:  no chest pain, no palpitation  Respiratory:  no SOB, no cough  GI:  no abd pain, no vomiting, no diarrhea  urinary: no dysuria, no hematuria, no flank pain  musculoskeletal:  no joint pain, no joint swelling  skin:  no rash  neurology:  no headache, no seizure, no change in mental status  psych: no anxiety, no depression         Allergies  No Known Allergies        ANTIMICROBIALS:  cefTRIAXone   IVPB 1000 every 24 hours      OTHER MEDS:  acetaminophen     Tablet .. 650 milliGRAM(s) Oral every 6 hours PRN  aluminum hydroxide/magnesium hydroxide/simethicone Suspension 30 milliLiter(s) Oral every 4 hours PRN  amLODIPine   Tablet 10 milliGRAM(s) Oral daily  aspirin  chewable 81 milliGRAM(s) Oral daily  atorvastatin 40 milliGRAM(s) Oral at bedtime  hydrALAZINE 100 milliGRAM(s) Oral two times a day  lisinopril 40 milliGRAM(s) Oral daily  melatonin 3 milliGRAM(s) Oral at bedtime PRN  ondansetron Injectable 4 milliGRAM(s) IV Push every 8 hours PRN      Vital Signs Last 24 Hrs  T(C): 36.7 (19 Jun 2025 15:36), Max: 36.9 (18 Jun 2025 16:41)  T(F): 98 (19 Jun 2025 15:36), Max: 98.4 (18 Jun 2025 16:41)  HR: 82 (19 Jun 2025 15:36) (68 - 87)  BP: 176/91 (19 Jun 2025 15:36) (161/82 - 176/91)  BP(mean): --  RR: 17 (19 Jun 2025 15:36) (16 - 17)  SpO2: 94% (19 Jun 2025 15:36) (94% - 97%)    Parameters below as of 19 Jun 2025 04:23  Patient On (Oxygen Delivery Method): room air        Physical Exam:  General:    NAD, non toxic, RA  Head: atraumatic, normocephalic  Eyes: normal sclera and conjunctiva  ENT:   no oropharyngeal lesions, no LAD, neck supple  Cardio:    regular S1,S2  Respiratory:   clear to auscultation b/l, no wheezing  abd:   soft, BS +, not tender, no distention  :     no CVAT, no suprapubic tenderness, + parker  Musculoskeletal : no joint swelling, no edema  Skin:    no rash  vascular:  normal pulses  Neurologic:     no focal deficits  psych: normal affect,     WBC Count: 6.09 K/uL (06-19 @ 05:32)  WBC Count: 9.52 K/uL (06-17 @ 17:05)                            13.4   6.09  )-----------( 111      ( 19 Jun 2025 05:32 )             38.2       06-19    143  |  112[H]  |  19  ----------------------------<  105[H]  4.2   |  25  |  1.26    Ca    9.0      19 Jun 2025 05:32    TPro  6.7  /  Alb  3.2[L]  /  TBili  0.7  /  DBili  x   /  AST  18  /  ALT  22  /  AlkPhos  61  06-19      Urinalysis Basic - ( 19 Jun 2025 05:32 )    Color: x / Appearance: x / SG: x / pH: x  Gluc: 105 mg/dL / Ketone: x  / Bili: x / Urobili: x   Blood: x / Protein: x / Nitrite: x   Leuk Esterase: x / RBC: x / WBC x   Sq Epi: x / Non Sq Epi: x / Bacteria: x        Creatinine Trend: 1.26<--, 1.37<--, 1.53<--      MICROBIOLOGY:  v  Clean Catch Clean Catch (Midstream)  06-17-25   No growth  --  --      RADIOLOGY:     GUERRIER, SAINTONY  MRN-[id number]  85y (1940)    Follow Up:  possible UTI    Interval History: The pt was seen and examined earlier, not in acute distress, no new complaints, awake and alert, appropriate, out of bed to chair, NSLIJ VS video  used # [id number]. Pt is afebrile, RA, no leukocytosis.     PAST MEDICAL & SURGICAL HISTORY:  HTN (hypertension)      Indwelling Parker catheter present          ROS:    [ ] Unobtainable because:  [x ] All other systems negative    Constitutional: no fever, no chills  Head: no trauma  Eyes: no vision changes, no eye pain  ENT:  no sore throat, no rhinorrhea  Cardiovascular:  no chest pain, no palpitation  Respiratory:  no SOB, no cough  GI:  no abd pain, no vomiting, no diarrhea  urinary: no dysuria, no hematuria, no flank pain  musculoskeletal:  no joint pain, no joint swelling  skin:  no rash  neurology:  no headache, no seizure, no change in mental status  psych: no anxiety, no depression         Allergies  No Known Allergies        ANTIMICROBIALS:  cefTRIAXone   IVPB 1000 every 24 hours      OTHER MEDS:  acetaminophen     Tablet .. 650 milliGRAM(s) Oral every 6 hours PRN  aluminum hydroxide/magnesium hydroxide/simethicone Suspension 30 milliLiter(s) Oral every 4 hours PRN  amLODIPine   Tablet 10 milliGRAM(s) Oral daily  aspirin  chewable 81 milliGRAM(s) Oral daily  atorvastatin 40 milliGRAM(s) Oral at bedtime  hydrALAZINE 100 milliGRAM(s) Oral two times a day  lisinopril 40 milliGRAM(s) Oral daily  melatonin 3 milliGRAM(s) Oral at bedtime PRN  ondansetron Injectable 4 milliGRAM(s) IV Push every 8 hours PRN      Vital Signs Last 24 Hrs  T(C): 36.7 (19 Jun 2025 15:36), Max: 36.9 (18 Jun 2025 16:41)  T(F): 98 (19 Jun 2025 15:36), Max: 98.4 (18 Jun 2025 16:41)  HR: 82 (19 Jun 2025 15:36) (68 - 87)  BP: 176/91 (19 Jun 2025 15:36) (161/82 - 176/91)  BP(mean): --  RR: 17 (19 Jun 2025 15:36) (16 - 17)  SpO2: 94% (19 Jun 2025 15:36) (94% - 97%)    Parameters below as of 19 Jun 2025 04:23  Patient On (Oxygen Delivery Method): room air        Physical Exam:  General:    NAD, non toxic, RA  Head: atraumatic, normocephalic  Eyes: normal sclera and conjunctiva  ENT:   no oropharyngeal lesions, no LAD, neck supple  Cardio:    regular S1,S2  Respiratory:   clear to auscultation b/l, no wheezing  abd:   soft, BS +, not tender, no distention  :     no CVAT, no suprapubic tenderness, + parker  Musculoskeletal : no joint swelling, no edema  Skin:    no rash  vascular:  normal pulses  Neurologic:     no focal deficits  psych: normal affect,     WBC Count: 6.09 K/uL (06-19 @ 05:32)  WBC Count: 9.52 K/uL (06-17 @ 17:05)                            13.4   6.09  )-----------( 111      ( 19 Jun 2025 05:32 )             38.2       06-19    143  |  112[H]  |  19  ----------------------------<  105[H]  4.2   |  25  |  1.26    Ca    9.0      19 Jun 2025 05:32    TPro  6.7  /  Alb  3.2[L]  /  TBili  0.7  /  DBili  x   /  AST  18  /  ALT  22  /  AlkPhos  61  06-19      Urinalysis Basic - ( 19 Jun 2025 05:32 )    Color: x / Appearance: x / SG: x / pH: x  Gluc: 105 mg/dL / Ketone: x  / Bili: x / Urobili: x   Blood: x / Protein: x / Nitrite: x   Leuk Esterase: x / RBC: x / WBC x   Sq Epi: x / Non Sq Epi: x / Bacteria: x        Creatinine Trend: 1.26<--, 1.37<--, 1.53<--      MICROBIOLOGY:    Clean Catch Clean Catch (Midstream)  06-17-25   No growth  --  --      RADIOLOGY:

## 2025-06-19 NOTE — PROGRESS NOTE ADULT - SUBJECTIVE AND OBJECTIVE BOX
Neurology Progress Note    S: Patient seen and examined    Medication:  acetaminophen     Tablet .. 650 milliGRAM(s) Oral every 6 hours PRN  aluminum hydroxide/magnesium hydroxide/simethicone Suspension 30 milliLiter(s) Oral every 4 hours PRN  amLODIPine   Tablet 10 milliGRAM(s) Oral daily  aspirin  chewable 81 milliGRAM(s) Oral daily  atorvastatin 40 milliGRAM(s) Oral at bedtime  cefTRIAXone   IVPB 1000 milliGRAM(s) IV Intermittent every 24 hours  finasteride 5 milliGRAM(s) Oral daily  hydrALAZINE 100 milliGRAM(s) Oral two times a day  lisinopril 40 milliGRAM(s) Oral daily  melatonin 3 milliGRAM(s) Oral at bedtime PRN  ondansetron Injectable 4 milliGRAM(s) IV Push every 8 hours PRN  polyethylene glycol 3350 17 Gram(s) Oral daily  senna 2 Tablet(s) Oral at bedtime  tamsulosin 0.8 milliGRAM(s) Oral at bedtime      Vitals:  Vital Signs Last 24 Hrs  T(C): 37.1 (19 Jun 2025 20:00), Max: 37.1 (19 Jun 2025 20:00)  T(F): 98.8 (19 Jun 2025 20:00), Max: 98.8 (19 Jun 2025 20:00)  HR: 95 (19 Jun 2025 20:00) (68 - 112)  BP: 157/85 (19 Jun 2025 20:00) (157/85 - 176/91)  BP(mean): --  RR: 18 (19 Jun 2025 20:00) (16 - 18)  SpO2: 94% (19 Jun 2025 20:00) (94% - 97%)    Parameters below as of 19 Jun 2025 20:00  Patient On (Oxygen Delivery Method): room air        General Exam:   General Appearance: Appropriately dressed and in no acute distress       Head: Normocephalic, atraumatic and no dysmorphic features  Ear, Nose, and Throat: Moist mucous membranes  CVS: S1S2+  Resp: No SOB, no wheeze or rhonchi  Abd: soft NTND  Extremities: No edema, no cyanosis  Skin: No bruises, no rashes     Neurological Exam:  Mental Status: Awake, alert and oriented x 2.  Able to follow simple commands. Speech fluent, no dysarthria   Cranial Nerves: PERRL, EOMI, VFFC, sensation V1-V3 intact,  no obvious facial asymmetry, equal elevation of palate, scm/trap 5/5, tongue is midline on protrusion. hearing is grossly intact.   Motor: Normal bulk, tone and strength throughout. Fine finger movements were intact and symmetric. no tremors or drift noted.    Sensation: Intact to light touch   Reflexes: 1+ throughout at biceps, brachioradialis, triceps, patellars and ankles bilaterally and equal. No clonus. R toe and L toe were both downgoing.  Coordination: No dysmetria on FNF  Gait: deferred      I personally reviewed the below data/images/labs:      CBC Full  -  ( 19 Jun 2025 05:32 )  WBC Count : 6.09 K/uL  RBC Count : 4.95 M/uL  Hemoglobin : 13.4 g/dL  Hematocrit : 38.2 %  Platelet Count - Automated : 111 K/uL  Mean Cell Volume : 77.2 fl  Mean Cell Hemoglobin : 27.1 pg  Mean Cell Hemoglobin Concentration : 35.1 g/dL  Auto Neutrophil # : x  Auto Lymphocyte # : x  Auto Monocyte # : x  Auto Eosinophil # : x  Auto Basophil # : x  Auto Neutrophil % : x  Auto Lymphocyte % : x  Auto Monocyte % : x  Auto Eosinophil % : x  Auto Basophil % : x    06-19    143  |  112[H]  |  19  ----------------------------<  105[H]  4.2   |  25  |  1.26    Ca    9.0      19 Jun 2025 05:32    TPro  6.7  /  Alb  3.2[L]  /  TBili  0.7  /  DBili  x   /  AST  18  /  ALT  22  /  AlkPhos  61  06-19    LIVER FUNCTIONS - ( 19 Jun 2025 05:32 )  Alb: 3.2 g/dL / Pro: 6.7 gm/dL / ALK PHOS: 61 U/L / ALT: 22 U/L / AST: 18 U/L / GGT: x             Urinalysis Basic - ( 19 Jun 2025 05:32 )    Color: x / Appearance: x / SG: x / pH: x  Gluc: 105 mg/dL / Ketone: x  / Bili: x / Urobili: x   Blood: x / Protein: x / Nitrite: x   Leuk Esterase: x / RBC: x / WBC x   Sq Epi: x / Non Sq Epi: x / Bacteria: x    < from: CT Brain Stroke Protocol (06.17.25 @ 16:37) >    ACC: [id number] EXAM:  CT ANGIO BRAIN STROKE PROTC IC   ORDERED BY:   SHAWN LUU     ACC: [id number] EXAM:  CT ANGIO NECK STROKE PROTCL IC   ORDERED BY:   SHAWN LUU     ACC: [id number] EXAM:  CT BRAIN STROKE PROTOCOL   ORDERED BY: SHAWN LUU     ACC: [id number] EXAM:  CT BRAIN PERFUSION MAPS STROKE   ORDERED BY:   SHAWN LUU     PROCEDURE DATE:  06/17/2025          INTERPRETATION:  CLINICAL STATEMENT: Stroke protocol.    TECHNIQUE: Stroke protocol.  CTA brain and neck. CT perfusion: After the   administration of 50 cc of Omnipaque 300 serial thin sections were   obtained through the brain the purposes of evaluating CT perfusion. Raw   data was sent to the ischemia rapid view software for postprocessing. 90   cc Omnipaque 350 Intravenous contrast was administered for the CTA   compartment and 10 cc discarded. 2-D MIP and 3-D volume rendering images.    COMPARISON: None.    FINDINGS:  CT Head:  There is moderate diffuse parenchymal volume loss.    There are areas of low attenuation in the periventricular white matter   likely related to mild chronic microvascular ischemic changes.    There is no acute intracranial hemorrhage, parenchymal mass, mass effect   or midline shift. There is no acute territorial infarct. There is no   hydrocephalus.    The cranium is intact. The visualized paranasal sinuses are well-aerated.      CTA of the neck:      The common carotid and internal carotid arteries are patent.    The extracranial vertebral arteries are patent. Dominant left vertebral   artery noted    Degenerative changes noted    CTA Head:  Calcified atherosclerotic plaques noted in the cavernous and supraclinoid   internal carotid arteries    The proximal anterior, middle and posterior cerebral arteries are patent.    The intracranial vertebral and basilar arteries are patent.    There is no intracranial aneurysm.        CT perfusion:  No core infarct.    Delayed mean transit time noted in the medial left frontal lobe.    CBF<30% volume: 0 ml  Tmax>6.0 s volume: 5 ml  Mismatch volume: 5 ml  Mismatch ratio: Infinite          IMPRESSION:  No acute intracranial hemorrhage or acute territorial infarct. If acute   ischemia is a strong clinical concern, MRI exam is recommended for   further evaluation if there is no contraindication. Dr. Luu notified   6/17/2025 at 4:54 PM    CT perfusion demonstrates delayed mean transit time in the medial left   frontal lobe with mismatched volume of 5 mL. Ischemic penumbra not   definitely excluded. MRI exam recommended.    No hemodynamically significant stenosis    --- End of Report ---            KENDELL COPE MD  This document has been electronically signed. Jun 17 2025  5:15PM    < end of copied text >    < from: MR Head No Cont (06.18.25 @ 12:11) >    ACC: [id number] EXAM:  MR BRAIN   ORDERED BY: CHAPITO HALEY DATE:  06/18/2025          INTERPRETATION:  CLINICAL STATEMENT: Pain.    TECHNIQUE: MRI of the brain without gadolinium.    COMPARISON: CT head 6/17/2025    FINDINGS:  There is moderate diffuse parenchymal volume loss.    There are nonspecific T2 prolongation signal abnormalities in the clarita,   periventricular subcortical white matter likely related to moderate   chronic microvascular ischemic changes.    There is no acute parenchymal hemorrhage, parenchymal mass, mass effect   or midline shift. There is no extra-axial fluid collection.  There is no   hydrocephalus.  There is no acute infarct.    Retention cyst/polyp left maxillary sinus. Partial opacification left   mastoid air cells.    IMPRESSION:  No acute intracranial hemorrhage or acute infarct.    --- End of Report ---    < end of copied text >

## 2025-06-19 NOTE — CONSULT NOTE ADULT - SUBJECTIVE AND OBJECTIVE BOX
Chief Complaint:  Patient is a 85y old  Male who presents with a chief complaint of Syncope vs. TIA vs. CVA (19 Jun 2025 16:02)      HPI:  Saintony Guerrier is an 85 year old male with PMHx of HTN and urinary retention (with indwelling Parker catheter) who presented to the ED on 6/17/25 for complaints of passing out and inability to speak.    [facility] ID #[id number] utilized. Patient was recently admitted to [facility] for left eye blurry vision, headache, and elevated blood pressure x 9 days. At that time, CT brain without acute intracranial findings. CTA H/N without LVO or stenosis. TTE and MRI brain unremarkable. Evaluated by cardiology for elevated blood pressure who recommended addition of HCTZ. Course complicated by acute urinary retention requiring Parker catheter placement. Planned to follow up with urology for voiding trial. Discharged this morning. When he returned home, family came over to assist him in the bathroom. Patient yelled out for help and passed out. Did not fall as family caught him. Then regained consciousness and did not speak but had eyes open. Does not recall episode. No tongue biting, urinary or bowel incontinence. Baseline functional status is ambulates unassisted and independent with all ADLs. Lives at home with daughter and son.    In the ED, VSS except HR as elevated as 108 and BP as elevated as 174/86. Plts 133K, Cr 1.53. U/A (obtained from old Parker) with trace leuks, negative nitrites, moderate blood, WBC 15, RBC 20, moderate bacteria, squamous epithelial cells present. U/A (obtained from new Parker) with proteinuria, small leuks, negative nitrites, large blood, WBC 10, RBC 35, many bacteria, squamous epithelial cells present. CT head without acute intracranial findings. CTA head and neck without hemodynamically significant stenosis. CT brain perfusion demonstrates delayed mean transit time in the medial left frontal lobe with mismatched volume of 5 mL. Ischemic penumbra not definitely excluded. Received 1L NS bolus. Requested ER physician to contact neurology, who recommends MRI brain. (17 Jun 2025 23:04)    Interval HPI:     84 Y/O male with a PMHx of HTN and BPH with no PSHx admitted with syncope vs TIA vs CVA. [employer] #[id number] for translation. Patient is poor historian. States that he was recently hospitalized at [facility] for elevated BP, blurred vision and headaches. During his stay, he was suddenly unable to urinate and had a catheter placed on 6/15/2025 and was referred to F/U with Urology as an outpatient to have catheter removed for trial of void. Shortly after being discharged from [facility], he syncopized at home and was admitted at [facility]. States prior to his hospitalization he was able to void without issue. Was seen by his PCP in 2022 and referred to a Urologist for BPH, but never followed up due to lack of transportation. States he lives with his daughter Suzan Corrales and she was unable to take him to his appointments. States that he does not take any medications such as Flomax/Finasteride. Denies frequent urine infections, hematuria, burning with urination, prior catheterization.       PMH/PSH:PAST MEDICAL & SURGICAL HISTORY:  HTN (hypertension)      Indwelling Parker catheter present          Allergies:  No Known Allergies      Medications:  acetaminophen     Tablet .. 650 milliGRAM(s) Oral every 6 hours PRN  aluminum hydroxide/magnesium hydroxide/simethicone Suspension 30 milliLiter(s) Oral every 4 hours PRN  amLODIPine   Tablet 10 milliGRAM(s) Oral daily  aspirin  chewable 81 milliGRAM(s) Oral daily  atorvastatin 40 milliGRAM(s) Oral at bedtime  cefTRIAXone   IVPB 1000 milliGRAM(s) IV Intermittent every 24 hours  hydrALAZINE 100 milliGRAM(s) Oral two times a day  lisinopril 40 milliGRAM(s) Oral daily  melatonin 3 milliGRAM(s) Oral at bedtime PRN  ondansetron Injectable 4 milliGRAM(s) IV Push every 8 hours PRN      Review of Systems:  Negative except for HPI    Relevant Family History:   FAMILY HISTORY:      Relevant Social History: Alcohol ( -) , Tobacco ( -) , Illicit drugs (- )     Physical Exam:    Vital Signs:  Vital Signs Last 24 Hrs  T(C): 36.7 (19 Jun 2025 15:36), Max: 36.8 (18 Jun 2025 23:40)  T(F): 98 (19 Jun 2025 15:36), Max: 98.2 (18 Jun 2025 23:40)  HR: 82 (19 Jun 2025 15:36) (68 - 87)  BP: 176/91 (19 Jun 2025 15:36) (161/82 - 176/91)  BP(mean): --  RR: 17 (19 Jun 2025 15:36) (16 - 17)  SpO2: 94% (19 Jun 2025 15:36) (94% - 97%)    Parameters below as of 19 Jun 2025 04:23  Patient On (Oxygen Delivery Method): room air      Daily     Daily     General:  Appears stated age, no distress  HEENT:  NC/AT,  conjunctivae clear and pink  Chest:  Full & symmetric excursion, no increased effort  Cardiovascular:  Regular rhythm  Abdomen:  Soft, non tender, non distended  : Circumcised phallus with parker catheter in place draining clear, yellow urine  Extremities:  No cyanosis, clubbing or edema  Skin:  No rash/erythema/ecchymoses/petechiae/wounds/abscess/warm/dry  Neuro/Psych:  Alert, oriented, no asterixis, no tremor, no encephalopathy    Laboratory:                          13.4   6.09  )-----------( 111      ( 19 Jun 2025 05:32 )             38.2     06-19    143  |  112[H]  |  19  ----------------------------<  105[H]  4.2   |  25  |  1.26    Ca    9.0      19 Jun 2025 05:32    TPro  6.7  /  Alb  3.2[L]  /  TBili  0.7  /  DBili  x   /  AST  18  /  ALT  22  /  AlkPhos  61  06-19    LIVER FUNCTIONS - ( 19 Jun 2025 05:32 )  Alb: 3.2 g/dL / Pro: 6.7 gm/dL / ALK PHOS: 61 U/L / ALT: 22 U/L / AST: 18 U/L / GGT: x           PT/INR - ( 17 Jun 2025 17:05 )   PT: 11.5 sec;   INR: 0.99 ratio         PTT - ( 17 Jun 2025 17:05 )  PTT:27.8 sec  Urinalysis Basic - ( 19 Jun 2025 05:32 )    Color: x / Appearance: x / SG: x / pH: x  Gluc: 105 mg/dL / Ketone: x  / Bili: x / Urobili: x   Blood: x / Protein: x / Nitrite: x   Leuk Esterase: x / RBC: x / WBC x   Sq Epi: x / Non Sq Epi: x / Bacteria: x          Intake and Output    06-18-25 @ 07:01  -  06-19-25 @ 07:00  --------------------------------------------------------  IN: 0 mL / OUT: 1100 mL / NET: -1100 mL

## 2025-06-19 NOTE — PROGRESS NOTE ADULT - NS ATTEND AMEND GEN_ALL_CORE FT
All labs and cultures and imaging and pertinent chart notes reviewed by me.    case d/w Np Tez at length and agree with her assessment and plan.    6/19:   no fever    no leukocytosis  , Cr normalized,   UCx-negative    Impression  possible syncopal episode   chronic indwelling catheter r/o CAUTI  HTN      Plan-  continue IV Ceftriaxone (day #2)  advise d/c abx after today's dose is complete.  advise if chronic parker needed to have it exchanged every 2 weeks  Urology evaluation appreciated  rest of the medical management as per medicine team.    Jil Gay MD  Infectious Disease Attending    for any questions please do not hesitate to contact me either via teams or by calling [phone]

## 2025-06-20 LAB
ALBUMIN SERPL ELPH-MCNC: 3.2 G/DL — LOW (ref 3.3–5)
ALP SERPL-CCNC: 67 U/L — SIGNIFICANT CHANGE UP (ref 40–120)
ALT FLD-CCNC: 19 U/L — SIGNIFICANT CHANGE UP (ref 12–78)
ANION GAP SERPL CALC-SCNC: 5 MMOL/L — SIGNIFICANT CHANGE UP (ref 5–17)
AST SERPL-CCNC: 19 U/L — SIGNIFICANT CHANGE UP (ref 15–37)
BILIRUB SERPL-MCNC: 0.5 MG/DL — SIGNIFICANT CHANGE UP (ref 0.2–1.2)
BUN SERPL-MCNC: 20 MG/DL — SIGNIFICANT CHANGE UP (ref 7–23)
CALCIUM SERPL-MCNC: 8.9 MG/DL — SIGNIFICANT CHANGE UP (ref 8.5–10.1)
CHLORIDE SERPL-SCNC: 108 MMOL/L — SIGNIFICANT CHANGE UP (ref 96–108)
CO2 SERPL-SCNC: 26 MMOL/L — SIGNIFICANT CHANGE UP (ref 22–31)
CREAT SERPL-MCNC: 1.48 MG/DL — HIGH (ref 0.5–1.3)
EGFR: 46 ML/MIN/1.73M2 — LOW
EGFR: 46 ML/MIN/1.73M2 — LOW
GLUCOSE SERPL-MCNC: 129 MG/DL — HIGH (ref 70–99)
HCT VFR BLD CALC: 38.5 % — LOW (ref 39–50)
HGB BLD-MCNC: 13.8 G/DL — SIGNIFICANT CHANGE UP (ref 13–17)
MCHC RBC-ENTMCNC: 27.2 PG — SIGNIFICANT CHANGE UP (ref 27–34)
MCHC RBC-ENTMCNC: 35.8 G/DL — SIGNIFICANT CHANGE UP (ref 32–36)
MCV RBC AUTO: 75.9 FL — LOW (ref 80–100)
NRBC BLD AUTO-RTO: 0 /100 WBCS — SIGNIFICANT CHANGE UP (ref 0–0)
PLATELET # BLD AUTO: 121 K/UL — LOW (ref 150–400)
POTASSIUM SERPL-MCNC: 3.5 MMOL/L — SIGNIFICANT CHANGE UP (ref 3.5–5.3)
POTASSIUM SERPL-SCNC: 3.5 MMOL/L — SIGNIFICANT CHANGE UP (ref 3.5–5.3)
PROT SERPL-MCNC: 6.7 GM/DL — SIGNIFICANT CHANGE UP (ref 6–8.3)
PSA FLD-MCNC: 6.12 NG/ML — HIGH (ref 0–4)
RBC # BLD: 5.07 M/UL — SIGNIFICANT CHANGE UP (ref 4.2–5.8)
RBC # FLD: 14.8 % — HIGH (ref 10.3–14.5)
SODIUM SERPL-SCNC: 139 MMOL/L — SIGNIFICANT CHANGE UP (ref 135–145)
WBC # BLD: 7.32 K/UL — SIGNIFICANT CHANGE UP (ref 3.8–10.5)
WBC # FLD AUTO: 7.32 K/UL — SIGNIFICANT CHANGE UP (ref 3.8–10.5)

## 2025-06-20 PROCEDURE — 74176 CT ABD & PELVIS W/O CONTRAST: CPT | Mod: 26

## 2025-06-20 PROCEDURE — 99232 SBSQ HOSP IP/OBS MODERATE 35: CPT

## 2025-06-20 PROCEDURE — 95816 EEG AWAKE AND DROWSY: CPT | Mod: 26

## 2025-06-20 RX ORDER — SALINE 7; 19 G/118ML; G/118ML
1 ENEMA RECTAL ONCE
Refills: 0 | Status: COMPLETED | OUTPATIENT
Start: 2025-06-20 | End: 2025-06-20

## 2025-06-20 RX ORDER — HEPARIN SODIUM 1000 [USP'U]/ML
5000 INJECTION INTRAVENOUS; SUBCUTANEOUS EVERY 8 HOURS
Refills: 0 | Status: DISCONTINUED | OUTPATIENT
Start: 2025-06-20 | End: 2025-06-23

## 2025-06-20 RX ADMIN — FINASTERIDE 5 MILLIGRAM(S): 1 TABLET, FILM COATED ORAL at 11:29

## 2025-06-20 RX ADMIN — CEFTRIAXONE 100 MILLIGRAM(S): 500 INJECTION, POWDER, FOR SOLUTION INTRAMUSCULAR; INTRAVENOUS at 05:02

## 2025-06-20 RX ADMIN — AMLODIPINE BESYLATE 10 MILLIGRAM(S): 10 TABLET ORAL at 05:02

## 2025-06-20 RX ADMIN — TAMSULOSIN HYDROCHLORIDE 0.8 MILLIGRAM(S): 0.4 CAPSULE ORAL at 22:06

## 2025-06-20 RX ADMIN — Medication 100 MILLIGRAM(S): at 17:00

## 2025-06-20 RX ADMIN — POLYETHYLENE GLYCOL 3350 17 GRAM(S): 17 POWDER, FOR SOLUTION ORAL at 11:29

## 2025-06-20 RX ADMIN — ATORVASTATIN CALCIUM 40 MILLIGRAM(S): 80 TABLET, FILM COATED ORAL at 22:06

## 2025-06-20 RX ADMIN — Medication 75 MILLILITER(S): at 22:06

## 2025-06-20 RX ADMIN — LISINOPRIL 40 MILLIGRAM(S): 5 TABLET ORAL at 05:02

## 2025-06-20 RX ADMIN — HEPARIN SODIUM 5000 UNIT(S): 1000 INJECTION INTRAVENOUS; SUBCUTANEOUS at 22:06

## 2025-06-20 RX ADMIN — Medication 81 MILLIGRAM(S): at 11:29

## 2025-06-20 RX ADMIN — Medication 75 MILLILITER(S): at 15:03

## 2025-06-20 RX ADMIN — Medication 100 MILLIGRAM(S): at 05:02

## 2025-06-20 RX ADMIN — SALINE 1 ENEMA: 7; 19 ENEMA RECTAL at 12:24

## 2025-06-20 NOTE — PROGRESS NOTE ADULT - SUBJECTIVE AND OBJECTIVE BOX
Patient is a 85y old  Male who presents with a chief complaint of Syncope vs. TIA vs. CVA (20 Jun 2025 09:05)    INTERVAL HPI/OVERNIGHT EVENTS: No acute events overnight. HD stable.     MEDICATIONS  (STANDING):  amLODIPine   Tablet 10 milliGRAM(s) Oral daily  aspirin  chewable 81 milliGRAM(s) Oral daily  atorvastatin 40 milliGRAM(s) Oral at bedtime  finasteride 5 milliGRAM(s) Oral daily  hydrALAZINE 100 milliGRAM(s) Oral two times a day  lisinopril 40 milliGRAM(s) Oral daily  polyethylene glycol 3350 17 Gram(s) Oral daily  senna 2 Tablet(s) Oral at bedtime  tamsulosin 0.8 milliGRAM(s) Oral at bedtime    MEDICATIONS  (PRN):  acetaminophen     Tablet .. 650 milliGRAM(s) Oral every 6 hours PRN Temp greater or equal to 38C (100.4F), Mild Pain (1 - 3)  aluminum hydroxide/magnesium hydroxide/simethicone Suspension 30 milliLiter(s) Oral every 4 hours PRN Dyspepsia  melatonin 3 milliGRAM(s) Oral at bedtime PRN Insomnia  ondansetron Injectable 4 milliGRAM(s) IV Push every 8 hours PRN Nausea and/or Vomiting      Allergies    No Known Allergies    Intolerances        REVIEW OF SYSTEMS: all negative with exception of above    Vital Signs Last 24 Hrs  T(C): 36.2 (20 Jun 2025 10:34), Max: 37.1 (19 Jun 2025 20:00)  T(F): 97.2 (20 Jun 2025 10:34), Max: 98.8 (19 Jun 2025 20:00)  HR: 106 (20 Jun 2025 13:00) (82 - 112)  BP: 147/75 (20 Jun 2025 10:34) (129/73 - 176/91)  BP(mean): 87 (20 Jun 2025 08:30) (87 - 87)  RR: 18 (20 Jun 2025 10:34) (17 - 18)  SpO2: 95% (20 Jun 2025 10:34) (94% - 99%)    Parameters below as of 20 Jun 2025 08:30  Patient On (Oxygen Delivery Method): room air        PHYSICAL EXAM:  GENERAL: NAD, well-groomed  NERVOUS SYSTEM:  Alert & Oriented X3, Good concentration; Motor Strength 5/5 B/L upper and lower extremities; DTRs 2+ intact and symmetric  CHEST/LUNG: Clear to percussion bilaterally; No rales, rhonchi, wheezing, or rubs  HEART: Regular rate and rhythm; No murmurs, rubs, or gallops  ABDOMEN: Soft, Nontender, Nondistended; Bowel sounds present  EXTREMITIES:  2+ Peripheral Pulses, No clubbing, cyanosis, or edema      LABS:                        13.8   7.32  )-----------( 121      ( 20 Jun 2025 08:27 )             38.5     06-20    139  |  108  |  20  ----------------------------<  129[H]  3.5   |  26  |  1.48[H]    Ca    8.9      20 Jun 2025 08:27    TPro  6.7  /  Alb  3.2[L]  /  TBili  0.5  /  DBili  x   /  AST  19  /  ALT  19  /  AlkPhos  67  06-20      Urinalysis Basic - ( 20 Jun 2025 08:27 )    Color: x / Appearance: x / SG: x / pH: x  Gluc: 129 mg/dL / Ketone: x  / Bili: x / Urobili: x   Blood: x / Protein: x / Nitrite: x   Leuk Esterase: x / RBC: x / WBC x   Sq Epi: x / Non Sq Epi: x / Bacteria: x      CAPILLARY BLOOD GLUCOSE          RADIOLOGY & ADDITIONAL TESTS:    Imaging Personally Reviewed:  [ ] YES  [ ] NO  < from: CT Abdomen and Pelvis No Cont (06.20.25 @ 10:10) >  IMPRESSION:    Urinary bladder collapsed around a Lind catheter, limiting evaluation,   with infiltration of the perivesicular fat; correlation is recommended   for cystitis.    Enlarged prostate with subtle areas of low density at the anterior aspect   of the prostate, 2 on the right and one on the left with the largest   measuring 2.1 x0.6 cm; prostate abscesses cannot be excluded.    The findings were discussed with Dr. Vernon on 6/20/2025 12:12 PM    --- End of Report ---            KYLE BURDEN MD  This document has been electronically signed. Jun 20 2025 12:14PM    < end of copied text >        Consultant(s) Notes Reviewed:  [ ] YES  [ ] NO    Care Discussed with Consultants/Other Providers [ ] YES  [ ] NO

## 2025-06-20 NOTE — PATIENT PROFILE ADULT - FALL HARM RISK - HARM RISK INTERVENTIONS
Assistance with ambulation/Assistance OOB with selected safe patient handling equipment/Communicate Risk of Fall with Harm to all staff/Monitor gait and stability/Reinforce activity limits and safety measures with patient and family/Sit up slowly, dangle for a short time, stand at bedside before walking/Tailored Fall Risk Interventions/Visual Cue: Yellow wristband and red socks/Bed in lowest position, wheels locked, appropriate side rails in place/Call bell, personal items and telephone in reach/Instruct patient to call for assistance before getting out of bed or chair/Non-slip footwear when patient is out of bed/[place] to call system/Physically safe environment - no spills, clutter or unnecessary equipment/Purposeful Proactive Rounding/Room/bathroom lighting operational, light cord in reach

## 2025-06-20 NOTE — PROGRESS NOTE ADULT - SUBJECTIVE AND OBJECTIVE BOX
Neurology Progress Note    S: Patient seen and examined  MEDICATIONS:    acetaminophen     Tablet .. 650 milliGRAM(s) Oral every 6 hours PRN  aluminum hydroxide/magnesium hydroxide/simethicone Suspension 30 milliLiter(s) Oral every 4 hours PRN  amLODIPine   Tablet 10 milliGRAM(s) Oral daily  aspirin  chewable 81 milliGRAM(s) Oral daily  atorvastatin 40 milliGRAM(s) Oral at bedtime  finasteride 5 milliGRAM(s) Oral daily  heparin   Injectable 5000 Unit(s) SubCutaneous every 8 hours  hydrALAZINE 100 milliGRAM(s) Oral two times a day  lisinopril 40 milliGRAM(s) Oral daily  melatonin 3 milliGRAM(s) Oral at bedtime PRN  ondansetron Injectable 4 milliGRAM(s) IV Push every 8 hours PRN  polyethylene glycol 3350 17 Gram(s) Oral daily  senna 2 Tablet(s) Oral at bedtime  sodium chloride 0.9%. 1000 milliLiter(s) IV Continuous <Continuous>  tamsulosin 0.8 milliGRAM(s) Oral at bedtime      LABS:                          13.8   7.32  )-----------( 121      ( 20 Jun 2025 08:27 )             38.5     06-20    139  |  108  |  20  ----------------------------<  129[H]  3.5   |  26  |  1.48[H]    Ca    8.9      20 Jun 2025 08:27    TPro  6.7  /  Alb  3.2[L]  /  TBili  0.5  /  DBili  x   /  AST  19  /  ALT  19  /  AlkPhos  67  06-20    CAPILLARY BLOOD GLUCOSE          Urinalysis Basic - ( 20 Jun 2025 08:27 )    Color: x / Appearance: x / SG: x / pH: x  Gluc: 129 mg/dL / Ketone: x  / Bili: x / Urobili: x   Blood: x / Protein: x / Nitrite: x   Leuk Esterase: x / RBC: x / WBC x   Sq Epi: x / Non Sq Epi: x / Bacteria: x      I&O's Summary    19 Jun 2025 07:01  -  20 Jun 2025 07:00  --------------------------------------------------------  IN: 0 mL / OUT: 1100 mL / NET: -1100 mL    20 Jun 2025 07:01  -  20 Jun 2025 16:24  --------------------------------------------------------  IN: 640 mL / OUT: 0 mL / NET: 640 mL      Vital Signs Last 24 Hrs  T(C): 36.9 (20 Jun 2025 16:10), Max: 37.1 (19 Jun 2025 20:00)  T(F): 98.5 (20 Jun 2025 16:10), Max: 98.8 (19 Jun 2025 20:00)  HR: 102 (20 Jun 2025 16:10) (84 - 112)  BP: 142/73 (20 Jun 2025 16:10) (129/73 - 157/85)  BP(mean): 87 (20 Jun 2025 08:30) (87 - 87)  RR: 18 (20 Jun 2025 16:10) (18 - 18)  SpO2: 95% (20 Jun 2025 16:10) (94% - 99%)    Parameters below as of 20 Jun 2025 16:10  Patient On (Oxygen Delivery Method): room air          General Exam:   General Appearance: Appropriately dressed and in no acute distress       Head: Normocephalic, atraumatic and no dysmorphic features  Ear, Nose, and Throat: Moist mucous membranes  CVS: S1S2+  Resp: No SOB, no wheeze or rhonchi  Abd: soft NTND  Extremities: No edema, no cyanosis  Skin: No bruises, no rashes     Neurological Exam:  Mental Status: Awake, alert and oriented x 2.  Able to follow simple commands. Speech fluent, no dysarthria   Cranial Nerves: PERRL, EOMI, VFFC, sensation V1-V3 intact,  no obvious facial asymmetry, equal elevation of palate, scm/trap 5/5, tongue is midline on protrusion. hearing is grossly intact.   Motor: Normal bulk, tone and strength throughout. Fine finger movements were intact and symmetric. no tremors or drift noted.    Sensation: Intact to light touch   Reflexes: 1+ throughout at biceps, brachioradialis, triceps, patellars and ankles bilaterally and equal. No clonus. R toe and L toe were both downgoing.  Coordination: No dysmetria on FNF  Gait: deferred      I personally reviewed the below data/images/labs:      < from: CT Brain Stroke Protocol (06.17.25 @ 16:37) >    ACC: [id number] EXAM:  CT ANGIO BRAIN STROKE PROTC IC   ORDERED BY:   SHAWN LUU     ACC: [id number] EXAM:  CT ANGIO NECK STROKE PROTCL IC   ORDERED BY:   SHAWN LUU     ACC: [id number] EXAM:  CT BRAIN STROKE PROTOCOL   ORDERED BY: SHAWN LUU     ACC: [id number] EXAM:  CT BRAIN PERFUSION MAPS STROKE   ORDERED BY:   SHAWN LUU     PROCEDURE DATE:  06/17/2025          INTERPRETATION:  CLINICAL STATEMENT: Stroke protocol.    TECHNIQUE: Stroke protocol.  CTA brain and neck. CT perfusion: After the   administration of 50 cc of Omnipaque 300 serial thin sections were   obtained through the brain the purposes of evaluating CT perfusion. Raw   data was sent to the ischemia rapid view software for postprocessing. 90   cc Omnipaque 350 Intravenous contrast was administered for the CTA   compartment and 10 cc discarded. 2-D MIP and 3-D volume rendering images.    COMPARISON: None.    FINDINGS:  CT Head:  There is moderate diffuse parenchymal volume loss.    There are areas of low attenuation in the periventricular white matter   likely related to mild chronic microvascular ischemic changes.    There is no acute intracranial hemorrhage, parenchymal mass, mass effect   or midline shift. There is no acute territorial infarct. There is no   hydrocephalus.    The cranium is intact. The visualized paranasal sinuses are well-aerated.      CTA of the neck:      The common carotid and internal carotid arteries are patent.    The extracranial vertebral arteries are patent. Dominant left vertebral   artery noted    Degenerative changes noted    CTA Head:  Calcified atherosclerotic plaques noted in the cavernous and supraclinoid   internal carotid arteries    The proximal anterior, middle and posterior cerebral arteries are patent.    The intracranial vertebral and basilar arteries are patent.    There is no intracranial aneurysm.        CT perfusion:  No core infarct.    Delayed mean transit time noted in the medial left frontal lobe.    CBF<30% volume: 0 ml  Tmax>6.0 s volume: 5 ml  Mismatch volume: 5 ml  Mismatch ratio: Infinite          IMPRESSION:  No acute intracranial hemorrhage or acute territorial infarct. If acute   ischemia is a strong clinical concern, MRI exam is recommended for   further evaluation if there is no contraindication. Dr. Luu notified   6/17/2025 at 4:54 PM    CT perfusion demonstrates delayed mean transit time in the medial left   frontal lobe with mismatched volume of 5 mL. Ischemic penumbra not   definitely excluded. MRI exam recommended.    No hemodynamically significant stenosis    --- End of Report ---            KENDELL COPE MD  This document has been electronically signed. Jun 17 2025  5:15PM    < end of copied text >    < from: MR Head No Cont (06.18.25 @ 12:11) >    ACC: [id number] EXAM:  MR BRAIN   ORDERED BY: CHAPITO LIN     PROCEDURE DATE:  06/18/2025          INTERPRETATION:  CLINICAL STATEMENT: Pain.    TECHNIQUE: MRI of the brain without gadolinium.    COMPARISON: CT head 6/17/2025    FINDINGS:  There is moderate diffuse parenchymal volume loss.    There are nonspecific T2 prolongation signal abnormalities in the clarita,   periventricular subcortical white matter likely related to moderate   chronic microvascular ischemic changes.    There is no acute parenchymal hemorrhage, parenchymal mass, mass effect   or midline shift. There is no extra-axial fluid collection.  There is no   hydrocephalus.  There is no acute infarct.    Retention cyst/polyp left maxillary sinus. Partial opacification left   mastoid air cells.    IMPRESSION:  No acute intracranial hemorrhage or acute infarct.    --- End of Report ---    < end of copied text >    e  EEG Classification / Summary:  Normal EEG study in the awake / drowsy states  -ECG: sinus tachycardia    -----------------------------------------------------------------------------------------------------    Clinical Impression:  Normal EEG study  There were no epileptiform abnormalities or seizures recorded.    Sinus tachycardia was present on ECG recording.

## 2025-06-20 NOTE — EEG REPORT - NS EEG TEXT BOX
GUERRIER, SAINTONY [id number]     Study Date: 06-20-25  Duration: 23 min    --------------------------------------------------------------------------------------------------  History:  CC/ HPI Patient is a 85y old  Male who presents with a chief complaint of Syncope vs. TIA vs. CVA (20 Jun 2025 14:23)    MEDICATIONS  (STANDING):  amLODIPine   Tablet 10 milliGRAM(s) Oral daily  aspirin  chewable 81 milliGRAM(s) Oral daily  atorvastatin 40 milliGRAM(s) Oral at bedtime  finasteride 5 milliGRAM(s) Oral daily  heparin   Injectable 5000 Unit(s) SubCutaneous every 8 hours  hydrALAZINE 100 milliGRAM(s) Oral two times a day  lisinopril 40 milliGRAM(s) Oral daily  polyethylene glycol 3350 17 Gram(s) Oral daily  senna 2 Tablet(s) Oral at bedtime  sodium chloride 0.9%. 1000 milliLiter(s) (75 mL/Hr) IV Continuous <Continuous>  tamsulosin 0.8 milliGRAM(s) Oral at bedtime    --------------------------------------------------------------------------------------------------  Study Interpretation:    [[[Abbreviation Key:  PDR=alpha rhythm/posterior dominant rhythm. A-P=anterior posterior gradient.  Amplitude: ‘very low’:<20; ‘low’:20-50; ‘medium’:; ‘high’:>200uV.  Persistence for periodic/rhythmic patterns (% of epoch) ‘rare’:<1%; ‘occasional’:1-10%; ‘frequent’:10-50%; ‘abundant’:50-90%; ‘continuous’:>90%.  Persistence for sporadic discharges: ‘rare’:<1/hr; ‘occasional’:1/min-1/hr; ‘frequent’:>1/min; ‘abundant’:>1/10 sec.  GRDA=generalized rhythmic delta activity; FIRDA=frontal intermittent GRDA; LRDA=lateralized rhythmic delta activity; TIRDA=temporal intermittent rhythmic delta activity;  LPD=PLED=lateralized periodic discharges; GPD=generalized periodic discharges; BiPDs=BiPLEDs=bilateral independent periodic epileptiform discharges; SIRPID=stimulus induced rhythmic, periodic, or ictal appearing discharges; BIRDs=brief potentially ictal rhythmic discharges >4 Hz, lasting .5-10s; PFA=paroxysmal bursts of beta/gamma; LVFA=low voltage fast activity.  Modifiers: +F=with fast component; +S=with spike component; +R=with rhythmic component.  S-B=burst suppression pattern.  Max=maximal. N1-drowsy; N2-stage II sleep; N3-slow wave sleep. SSS/BETS=small sharp spikes/benign epileptiform transients of sleep. HV=hyperventilation; PS=photic stimulation]]]    FINDINGS:      Background:  Continuity: Continuous  Symmetry: Symmetric  PDR: 8.5 - 9 Hz activity, with amplitude to 40 uV, that attenuated to eye opening.    Reactivity: Present  Voltage: Normal (between 20-150uV)  Anterior Posterior Gradient: Present  Other background findings: None  Breach: Absent    Background Slowing:  Generalized slowing: None was present.  Focal slowing: None was present.    State Changes:   -Drowsiness noted with increased slowing, attenuation of fast activity, vertex transients.  -N2 sleep transients were not recorded.    Interictal Findings:  None    Electrographic and Electroclinical seizures:  None    Other Clinical Events:  None    Activation Procedures:   -Hyperventilation was not performed.    -Photic stimulation was performed and did not elicit any abnormalities.       Artifacts:  Intermittent myogenic and movement artifacts were noted.    ECG:  The heart rate on single channel ECG was predominantly between 90 - 100 BPM, at times up to 110 BPM.    EEG Classification / Summary:  Normal EEG study in the awake / drowsy states  -ECG: sinus tachycardia    -----------------------------------------------------------------------------------------------------    Clinical Impression:  Normal EEG study  There were no epileptiform abnormalities or seizures recorded.    Sinus tachycardia was present on ECG recording.    This is a preliminary impression still pending attendings attestation.     -------------------------------------------------------------------------------------------------------  EEG reading room: [phone]    After-hours epilepsy service: [phone]    Carlos Barnett DO  Epilepsy Fellow GUERRIER, SAINTONY [id number]     Study Date: 06-20-25  Duration: 23 min    --------------------------------------------------------------------------------------------------  History:  CC/ HPI Patient is a 85y old  Male who presents with a chief complaint of Syncope vs. TIA vs. CVA (20 Jun 2025 14:23)    MEDICATIONS  (STANDING):  amLODIPine   Tablet 10 milliGRAM(s) Oral daily  aspirin  chewable 81 milliGRAM(s) Oral daily  atorvastatin 40 milliGRAM(s) Oral at bedtime  finasteride 5 milliGRAM(s) Oral daily  heparin   Injectable 5000 Unit(s) SubCutaneous every 8 hours  hydrALAZINE 100 milliGRAM(s) Oral two times a day  lisinopril 40 milliGRAM(s) Oral daily  polyethylene glycol 3350 17 Gram(s) Oral daily  senna 2 Tablet(s) Oral at bedtime  sodium chloride 0.9%. 1000 milliLiter(s) (75 mL/Hr) IV Continuous <Continuous>  tamsulosin 0.8 milliGRAM(s) Oral at bedtime    --------------------------------------------------------------------------------------------------  Study Interpretation:    [[[Abbreviation Key:  PDR=alpha rhythm/posterior dominant rhythm. A-P=anterior posterior gradient.  Amplitude: ‘very low’:<20; ‘low’:20-50; ‘medium’:; ‘high’:>200uV.  Persistence for periodic/rhythmic patterns (% of epoch) ‘rare’:<1%; ‘occasional’:1-10%; ‘frequent’:10-50%; ‘abundant’:50-90%; ‘continuous’:>90%.  Persistence for sporadic discharges: ‘rare’:<1/hr; ‘occasional’:1/min-1/hr; ‘frequent’:>1/min; ‘abundant’:>1/10 sec.  GRDA=generalized rhythmic delta activity; FIRDA=frontal intermittent GRDA; LRDA=lateralized rhythmic delta activity; TIRDA=temporal intermittent rhythmic delta activity;  LPD=PLED=lateralized periodic discharges; GPD=generalized periodic discharges; BiPDs=BiPLEDs=bilateral independent periodic epileptiform discharges; SIRPID=stimulus induced rhythmic, periodic, or ictal appearing discharges; BIRDs=brief potentially ictal rhythmic discharges >4 Hz, lasting .5-10s; PFA=paroxysmal bursts of beta/gamma; LVFA=low voltage fast activity.  Modifiers: +F=with fast component; +S=with spike component; +R=with rhythmic component.  S-B=burst suppression pattern.  Max=maximal. N1-drowsy; N2-stage II sleep; N3-slow wave sleep. SSS/BETS=small sharp spikes/benign epileptiform transients of sleep. HV=hyperventilation; PS=photic stimulation]]]    FINDINGS:      Background:  Continuity: Continuous  Symmetry: Symmetric  PDR: 8-8.5 Hz, reactive to eye closure  Voltage: Normal  Anterior Posterior Gradient: Present, low-amplitude frontal beta  Other background findings: None  Breach: Absent    Background Slowing:  Generalized slowing: None  Focal slowing: None    State Changes:   Drowsiness is characterized by fragmentation, attenuation, and slowing of the background activity.  Stage 2 sleep is not captured.    Interictal Findings:  None    Electrographic and Electroclinical seizures:  None    Other Clinical Events:  None    Activation Procedures:   -Hyperventilation was not performed.    -Photic stimulation was performed and did not elicit any abnormalities.       Artifacts:  Intermittent myogenic and movement artifacts    Single-lead EKG: Regular rhythm at 100-110 bpm    EEG Classification / Summary:  Normal EEG in the awake / drowsy states  No focal or epileptiform abnormalities.  No electrographic seizures.    Clinical Impression:  A normal routine EEG neither refutes nor supports a diagnosis of epilepsy.  No epileptiform abnormalities or seizures captured.       -------------------------------------------------------------------------------------------------------  EEG reading room: [phone]  After-hours epilepsy service: [phone]    Carlos Barnett DO  Epilepsy Fellow    Cristina West MD  Attending Physician, [facility] [place]

## 2025-06-20 NOTE — PROGRESS NOTE ADULT - SUBJECTIVE AND OBJECTIVE BOX
Patient seen and examined bedside with Dr. Durant resting comfortably.  No complaints offered.   Parker catheter in place.   Denies hematuria and dysuria.  Denies N/V, chest pain, dyspnea, cough.    T(F): 97.2 (06-20-25 @ 10:34), Max: 98.8 (06-19-25 @ 20:00)  HR: 105 (06-20-25 @ 10:34) (82 - 112)  BP: 147/75 (06-20-25 @ 10:34) (129/73 - 176/91)  RR: 18 (06-20-25 @ 10:34) (17 - 18)  SpO2: 95% (06-20-25 @ 10:34) (94% - 99%)  Wt(kg): --  CAPILLARY BLOOD GLUCOSE          PHYSICAL EXAM:  General: NAD, alert and awake  HEENT: NCAT, EOMI, conjunctiva clear  Chest: Nonlabored respirations, good inspiratory effort  Abdomen: Soft, NTND.   Extremities: No pedal edema or calf tenderness noted   : No CVA or SP tenderness; circumcised phallus with parker catheter draining clear, yellow urine      LABS:                        13.8   7.32  )-----------( 121      ( 20 Jun 2025 08:27 )             38.5   06-20    139  |  108  |  20  ----------------------------<  129[H]  3.5   |  26  |  1.48[H]    Ca    8.9      20 Jun 2025 08:27    TPro  6.7  /  Alb  3.2[L]  /  TBili  0.5  /  DBili  x   /  AST  19  /  ALT  19  /  AlkPhos  67  06-20    I&O's Detail    19 Jun 2025 07:01  -  20 Jun 2025 07:00  --------------------------------------------------------  IN:  Total IN: 0 mL    OUT:    Indwelling Catheter - Urethral (mL): 500 mL    Voided (mL): 600 mL  Total OUT: 1100 mL    Total NET: -1100 mL      20 Jun 2025 07:01  -  20 Jun 2025 11:12  --------------------------------------------------------  IN:    Oral Fluid: 320 mL  Total IN: 320 mL    OUT:  Total OUT: 0 mL    Total NET: 320 mL

## 2025-06-21 ENCOUNTER — TRANSCRIPTION ENCOUNTER (OUTPATIENT)
Age: 85
End: 2025-06-21

## 2025-06-21 LAB
ALBUMIN SERPL ELPH-MCNC: 3.1 G/DL — LOW (ref 3.3–5)
ALP SERPL-CCNC: 63 U/L — SIGNIFICANT CHANGE UP (ref 40–120)
ALT FLD-CCNC: 21 U/L — SIGNIFICANT CHANGE UP (ref 12–78)
ANION GAP SERPL CALC-SCNC: 4 MMOL/L — LOW (ref 5–17)
AST SERPL-CCNC: 19 U/L — SIGNIFICANT CHANGE UP (ref 15–37)
BILIRUB SERPL-MCNC: 1 MG/DL — SIGNIFICANT CHANGE UP (ref 0.2–1.2)
BUN SERPL-MCNC: 18 MG/DL — SIGNIFICANT CHANGE UP (ref 7–23)
CALCIUM SERPL-MCNC: 9.5 MG/DL — SIGNIFICANT CHANGE UP (ref 8.5–10.1)
CHLORIDE SERPL-SCNC: 110 MMOL/L — HIGH (ref 96–108)
CO2 SERPL-SCNC: 27 MMOL/L — SIGNIFICANT CHANGE UP (ref 22–31)
CREAT SERPL-MCNC: 1.2 MG/DL — SIGNIFICANT CHANGE UP (ref 0.5–1.3)
EGFR: 59 ML/MIN/1.73M2 — LOW
EGFR: 59 ML/MIN/1.73M2 — LOW
GLUCOSE SERPL-MCNC: 104 MG/DL — HIGH (ref 70–99)
HCT VFR BLD CALC: 38.9 % — LOW (ref 39–50)
HGB BLD-MCNC: 13.6 G/DL — SIGNIFICANT CHANGE UP (ref 13–17)
MCHC RBC-ENTMCNC: 26.7 PG — LOW (ref 27–34)
MCHC RBC-ENTMCNC: 35 G/DL — SIGNIFICANT CHANGE UP (ref 32–36)
MCV RBC AUTO: 76.3 FL — LOW (ref 80–100)
NRBC BLD AUTO-RTO: 0 /100 WBCS — SIGNIFICANT CHANGE UP (ref 0–0)
PLATELET # BLD AUTO: 123 K/UL — LOW (ref 150–400)
POTASSIUM SERPL-MCNC: 3.8 MMOL/L — SIGNIFICANT CHANGE UP (ref 3.5–5.3)
POTASSIUM SERPL-SCNC: 3.8 MMOL/L — SIGNIFICANT CHANGE UP (ref 3.5–5.3)
PROT SERPL-MCNC: 6.6 GM/DL — SIGNIFICANT CHANGE UP (ref 6–8.3)
RBC # BLD: 5.1 M/UL — SIGNIFICANT CHANGE UP (ref 4.2–5.8)
RBC # FLD: 14.6 % — HIGH (ref 10.3–14.5)
SODIUM SERPL-SCNC: 141 MMOL/L — SIGNIFICANT CHANGE UP (ref 135–145)
WBC # BLD: 5.99 K/UL — SIGNIFICANT CHANGE UP (ref 3.8–10.5)
WBC # FLD AUTO: 5.99 K/UL — SIGNIFICANT CHANGE UP (ref 3.8–10.5)

## 2025-06-21 PROCEDURE — 99232 SBSQ HOSP IP/OBS MODERATE 35: CPT

## 2025-06-21 RX ORDER — NYSTATIN 100000 [USP'U]/G
1 CREAM TOPICAL
Refills: 0 | Status: DISCONTINUED | OUTPATIENT
Start: 2025-06-21 | End: 2025-06-23

## 2025-06-21 RX ADMIN — AMLODIPINE BESYLATE 10 MILLIGRAM(S): 10 TABLET ORAL at 05:17

## 2025-06-21 RX ADMIN — HEPARIN SODIUM 5000 UNIT(S): 1000 INJECTION INTRAVENOUS; SUBCUTANEOUS at 23:28

## 2025-06-21 RX ADMIN — Medication 81 MILLIGRAM(S): at 11:40

## 2025-06-21 RX ADMIN — HEPARIN SODIUM 5000 UNIT(S): 1000 INJECTION INTRAVENOUS; SUBCUTANEOUS at 05:16

## 2025-06-21 RX ADMIN — POLYETHYLENE GLYCOL 3350 17 GRAM(S): 17 POWDER, FOR SOLUTION ORAL at 11:40

## 2025-06-21 RX ADMIN — ATORVASTATIN CALCIUM 40 MILLIGRAM(S): 80 TABLET, FILM COATED ORAL at 23:28

## 2025-06-21 RX ADMIN — NYSTATIN 1 APPLICATION(S): 100000 CREAM TOPICAL at 17:52

## 2025-06-21 RX ADMIN — Medication 100 MILLIGRAM(S): at 05:17

## 2025-06-21 RX ADMIN — TAMSULOSIN HYDROCHLORIDE 0.8 MILLIGRAM(S): 0.4 CAPSULE ORAL at 23:29

## 2025-06-21 RX ADMIN — FINASTERIDE 5 MILLIGRAM(S): 1 TABLET, FILM COATED ORAL at 11:40

## 2025-06-21 RX ADMIN — Medication 100 MILLIGRAM(S): at 17:51

## 2025-06-21 RX ADMIN — LISINOPRIL 40 MILLIGRAM(S): 5 TABLET ORAL at 05:17

## 2025-06-21 RX ADMIN — HEPARIN SODIUM 5000 UNIT(S): 1000 INJECTION INTRAVENOUS; SUBCUTANEOUS at 14:50

## 2025-06-21 NOTE — DISCHARGE NOTE PROVIDER - HOSPITAL COURSE
Mr. Dangelo is an 85-year-old male with a past medical history of hypertension and urinary retention requiring an indwelling Lind catheter, who presented to the ED on 6/17/25 with a witnessed syncopal episode accompanied by transient aphasia. He had recently been discharged from [facility] following a 9-day admission for left eye blurry vision, headache, and elevated blood pressure. During that admission, a CT brain, CTA head and neck, TTE, and MRI brain were unremarkable. Hydrochlorothiazide was added for blood pressure management. He developed acute urinary retention requiring Lind catheter placement and was awaiting urology follow-up. Upon returning home, Mr. Dangelo experienced a syncopal event witnessed by family. He did not fall, had no tongue biting, and experienced no incontinence but had transient aphasia. In the ED, he was hemodynamically stable except for elevated heart rate and blood pressure. Initial labs revealed an elevated creatinine. Urinalysis from the existing Lind catheter and a newly placed Lind catheter showed signs of infection. CT head and CTA head and neck were again negative for acute findings. CT brain perfusion revealed a delayed mean transit time in the left frontal lobe. He received fluid resuscitation. Neurology was consulted and recommended an MRI brain. Subsequent evaluations included a normal EEG with sinus tachycardia. His creatinine normalized, and urine culture showed no growth. He received a short course of intravenous ceftriaxone. Physical and occupational therapy were consulted. Discharge diagnoses: (1) Syncope, (2) Transient aphasia, (3) Urinary tract infection, (4) Acute kidney injury, (5) Hypertension, (6) Urinary retention. Neurology recommendations included seizure precautions, EEG monitoring, and consideration of alternative antihypertensive medications. Infectious Disease recommendations included a short course of antibiotics for the UTI. Urology recommendations included continued catheter management, monitoring urine output, and follow-up for voiding trials and PSA. Mr. Dangelo is an 85-year-old male with a past medical history of hypertension and urinary retention requiring an indwelling Lind catheter, who presented to the ED on 6/17/25 with a witnessed syncopal episode accompanied by transient aphasia. He had recently been discharged from [facility] following a 9-day admission for left eye blurry vision, headache, and elevated blood pressure. During that admission, a CT brain, CTA head and neck, TTE, and MRI brain were unremarkable. Hydrochlorothiazide was added for blood pressure management. He developed acute urinary retention requiring Lind catheter placement and was awaiting urology follow-up. Upon returning home, Mr. Dangelo experienced a syncopal event witnessed by family. He did not fall, had no tongue biting, and experienced no incontinence but had transient aphasia. In the ED, he was hemodynamically stable except for elevated heart rate and blood pressure. Initial labs revealed an elevated creatinine. Urinalysis from the existing Lind catheter and a newly placed Lind catheter showed signs of infection. CT head and CTA head and neck were again negative for acute findings. CT brain perfusion revealed a delayed mean transit time in the left frontal lobe. He received fluid resuscitation. Neurology was consulted and recommended an MRI brain. Subsequent evaluations included a normal EEG with sinus tachycardia. His creatinine normalized, and urine culture showed no growth. He received a short course of intravenous ceftriaxone. Physical and occupational therapy were consulted. Discharge diagnoses: (1) Syncope, (2) Transient aphasia, (3) Acute kidney injury, (4) Hypertension, (5) Urinary retention. Neurology recommendations included seizure precautions, EEG monitoring, and consideration of alternative antihypertensive medications. Infectious Disease recommendations included a short course of antibiotics for the UTI. Urology recommendations included continued catheter management, monitoring urine output, and follow-up for voiding trials and PSA.      85 year old male with PMHx of HTN and urinary retention (with indwelling Lind catheter) who presented to the ED on 6/17/25 for complaints of passing out and inability to speak and admitted for syncope vs. TIA vs. CVA.      Syncope vs. TIA vs. CVA  In pt with recent admission for left eye blurry vision and headache x 9 days, hospitalized at [facility], CT head, CTA H/N, TTE, and MRI brain were all unremarkable  Patient yelled out for help while in the bathroom, passed out, did not fall as family caught him  Then regained consciousness and did not speak but had eyes open, no reported tongue biting, urinary or bowel incontinence  Patient himself does not recall episode, symptoms resolved while in the ER  NIHSS 9 as per ER physician documentation, not TNK candidate given improved symptoms  CT head without acute intracranial findings  CTA head and neck without hemodynamically significant stenosis  CT brain perfusion demonstrates delayed mean transit time in the medial L. frontal lobe with mismatched volume of 5 mL, ischemic penumbra not definitely excluded  ASA 81 mg and atorvastatin 40 mg started  PT/OT consulted- outpt PT  Neuro recs appreciated - unclear if related to addition of HCTZ vs infectious vs metabolix (GAMA)  MRI-H reviewed  EEG read Neg   DC planning    #chronic indwelling catheter r/o CAUTI; elevated PSA  Underwent Lind catheter placement for acute urinary retention during recent hospitalization at [facility]  U/A (obtained from new Lind) with proteinuria, small leuks, negative nitrites, large blood, WBC 10, RBC 35, many bacteria, squamous epithelial cells present  - Appreciate ID recs- d/c CTX  - Urology recs appreciated -- continue flomax/finasteride, when patient is discharged home he should keep the Lind in place and follow up with Urologist, Dr. Durant, as an outpatient. - F/u Ucx NGTD     GAMA - resolved  likely pre-renal  Avoid nephrotoxins, renally dose meds  Encourage PO hydration  Monitor renal function    Chronic medical conditions:  HTN, uncontrolled: BP as elevated as 174/86 on admission, PTA hydralazine 100 mg BID, amlodipine 10 mg, lisinopril 40 mg held due to allowing for permissive HTN, recent dc summary states was recommended to start on HCTZ but patient is not on it--his blood pressure has been controlled without HCTZ.  Start on coreg 5.25  Thrombocytopenia, chronic: plts 133K on admission, unknown baseline but plts 114K (on [facility] H&P on 6/15/25), no signs of active bleeding    DVT ppx- heparin ppx  PT eval- O/p PT

## 2025-06-21 NOTE — DISCHARGE NOTE PROVIDER - NSDCCPCAREPLAN_GEN_ALL_CORE_FT
PRINCIPAL DISCHARGE DIAGNOSIS  Diagnosis: Difficulty speaking  Assessment and Plan of Treatment: You were started on Aspirin and Atorvastatin. COntinue to take as directed      SECONDARY DISCHARGE DIAGNOSES  Diagnosis: Urinary retention  Assessment and Plan of Treatment: You are being discharged with parker catheter. Follow with Urologist for follow up

## 2025-06-21 NOTE — DISCHARGE NOTE PROVIDER - NSFOLLOWUPCLINICS_GEN_ALL_ED_FT
[facility]  Neurology  [address] [place] [address] [address]rd [place] [address]  Phone: [phone]  Fax:   Follow Up Time: 2 weeks

## 2025-06-21 NOTE — DISCHARGE NOTE PROVIDER - EXTENDED VTE YES NO FOR MLM ENOXAPARIN
Anxiety/depressive symptoms overall stable, continue present medication. Continue CPAP, and follow-up with sleep medicine specialist as recommended/as needed. Advise you start regular aerobic exercise/activity as able/as discussed. If doing well, then repeat office visit around 6 months, sooner as needed. If you want to, consider making the follow-up in 6 months a \"physical\" if you desire.
,

## 2025-06-21 NOTE — DISCHARGE NOTE PROVIDER - PROVIDER TOKENS
FREE:[LAST:[Deroches],FIRST:[Jose],PHONE:[(   )    -],FAX:[(   )    -],FOLLOWUP:[1 week]],PROVIDER:[TOKEN:[[id number]],FOLLOWUP:[1 week]]

## 2025-06-21 NOTE — PROGRESS NOTE ADULT - SUBJECTIVE AND OBJECTIVE BOX
Patient is a 85y old  Male who presents with a chief complaint of Syncope vs. TIA vs. CVA (20 Jun 2025 09:05)    INTERVAL HPI/OVERNIGHT EVENTS: No acute events overnight. HD stable.     T(C): 36.6 (06-21-25 @ 10:06), Max: 36.9 (06-21-25 @ 04:28)  HR: 94 (06-21-25 @ 10:06) (87 - 94)  BP: 126/63 (06-21-25 @ 10:06) (126/63 - 143/73)  RR: 18 (06-21-25 @ 10:06) (18 - 18)  SpO2: 94% (06-21-25 @ 10:06) (94% - 96%)    MEDICATIONS  (STANDING):  amLODIPine   Tablet 10 milliGRAM(s) Oral daily  aspirin  chewable 81 milliGRAM(s) Oral daily  atorvastatin 40 milliGRAM(s) Oral at bedtime  finasteride 5 milliGRAM(s) Oral daily  heparin   Injectable 5000 Unit(s) SubCutaneous every 8 hours  hydrALAZINE 100 milliGRAM(s) Oral two times a day  lisinopril 40 milliGRAM(s) Oral daily  senna 2 Tablet(s) Oral at bedtime  sodium chloride 0.9%. 1000 milliLiter(s) (75 mL/Hr) IV Continuous <Continuous>  tamsulosin 0.8 milliGRAM(s) Oral at bedtime    MEDICATIONS  (PRN):  acetaminophen     Tablet .. 650 milliGRAM(s) Oral every 6 hours PRN Temp greater or equal to 38C (100.4F), Mild Pain (1 - 3)  aluminum hydroxide/magnesium hydroxide/simethicone Suspension 30 milliLiter(s) Oral every 4 hours PRN Dyspepsia  melatonin 3 milliGRAM(s) Oral at bedtime PRN Insomnia  ondansetron Injectable 4 milliGRAM(s) IV Push every 8 hours PRN Nausea and/or Vomiting                    PHYSICAL EXAM:  GENERAL: NAD, well-groomed  NERVOUS SYSTEM:  Alert & Oriented X3, Good concentration; Motor Strength 5/5 B/L upper and lower extremities; DTRs 2+ intact and symmetric  CHEST/LUNG: Clear to percussion bilaterally; No rales, rhonchi, wheezing, or rubs  HEART: Regular rate and rhythm; No murmurs, rubs, or gallops  ABDOMEN: Soft, Nontender, Nondistended; Bowel sounds present  EXTREMITIES:  2+ Peripheral Pulses, No clubbing, cyanosis, or edema                            13.6   5.99  )-----------( 123      ( 21 Jun 2025 07:10 )             38.9           LIVER FUNCTIONS - ( 21 Jun 2025 07:10 )  Alb: 3.1 g/dL / Pro: 6.6 gm/dL / ALK PHOS: 63 U/L / ALT: 21 U/L / AST: 19 U/L / GGT: x             141|110|18<104  3.8|27|1.20  9.5,--,--  06-21 @ 07:10

## 2025-06-21 NOTE — PROGRESS NOTE ADULT - SUBJECTIVE AND OBJECTIVE BOX
Patient seen and  examined at bedside resting comfortably.   Offers no complaints, denies any pain.   Denies fever, chills, N/V/D, CP, SOB.     Vital Signs Last 24 Hrs  T(F): 98.6 (06-21-25 @ 16:15), Max: 98.9 (06-20-25 @ 20:00)  HR: 93 (06-21-25 @ 16:15)  BP: 137/72 (06-21-25 @ 16:15)  RR: 18 (06-21-25 @ 16:15)  SpO2: 96% (06-21-25 @ 16:15)    PHYSICAL EXAM:  GENERAL: Alert, NAD  CHEST/LUNG: Clear to auscultation bilaterally, respirations nonlabored  HEART: Regular rate and rhythm; S1 & S2 appreciated  ABDOMEN: + Bowel sounds, soft, Nontender, Nondistended  : no suprapubic tenderness or distention. Indwelling catheter draining clear, yellow urine   EXTREMITIES:  no calf tenderness, No edema    I&O's Detail    20 Jun 2025 07:01  -  21 Jun 2025 07:00  --------------------------------------------------------  IN:    Oral Fluid: 640 mL    sodium chloride 0.9%: 150 mL  Total IN: 790 mL    OUT:    Indwelling Catheter - Urethral (mL): 300 mL  Total OUT: 300 mL    Total NET: 490 mL      21 Jun 2025 07:01  -  21 Jun 2025 19:37  --------------------------------------------------------  IN:    Oral Fluid: 640 mL  Total IN: 640 mL    OUT:  Total OUT: 0 mL    Total NET: 640 mL          LABS:                        13.6   5.99  )-----------( 123      ( 21 Jun 2025 07:10 )             38.9     06-21    141  |  110[H]  |  18  ----------------------------<  104[H]  3.8   |  27  |  1.20    Ca    9.5      21 Jun 2025 07:10    TPro  6.6  /  Alb  3.1[L]  /  TBili  1.0  /  DBili  x   /  AST  19  /  ALT  21  /  AlkPhos  63  06-21    RADIOLOGY & ADDITIONAL STUDIES:  < from: CT Abdomen and Pelvis No Cont (06.20.25 @ 10:10) >  ACC: [id number] EXAM:  CT ABDOMEN AND PELVIS   ORDERED BY: TIM MAO     PROCEDURE DATE:  06/20/2025          INTERPRETATION:  CLINICAL INFORMATION: Evaluate prostate size.    COMPARISON: None.    CONTRAST/COMPLICATIONS:  IV Contrast: NONE  Oral Contrast: NONE.    PROCEDURE:  CT of the Abdomen and Pelvis was performed.  Sagittal and coronal reformats were performed.    FINDINGS:  LOWER CHEST: Partially visualized bilateral lower lobe groundglass   opacities, likely atelectasis.    LIVER: Within normal limits.  BILE DUCTS: Normal caliber.  GALLBLADDER: Limited secondary to streak artifact. No wall thickening.   Probable fundal adenomyomatosis.  SPLEEN: Within normal limits.  PANCREAS: Within normal limits.  ADRENALS: Within normal limits.  KIDNEYS/URETERS: No renal stones or hydronephrosis. Low-attenuation   lesions in left kidney, incomplete characterized on this study.    BLADDER: Collapsed around a Parker catheter, limiting evaluation. Mild   infiltration of the perivesicular fat. Air in urinary bladder compatible   with the instrumentation.  REPRODUCTIVE ORGANS: Enlarged measuring 5.5 x 5.3 x 6.3 cm (transverse x   anteroposterior x craniocaudad) for a volume of 96 mL. Curvilinear low   density at the anterior aspect of the prostate on the right in the base   to mid gland measuring 2.1 x 0.6 cm with two additional smaller subtle   areas of low density anteriorly, one on the right and one on the left   (series 2 image 108); evaluation is limited on this noncontrast study,   however small prostate abscesses cannot be excluded.    BOWEL: No bowel obstruction. Appendix is not visualized. No evidence of   inflammation in the pericecal region.  PERITONEUM/RETROPERITONEUM: No ascites.  VESSELS: Mild atherosclerotic changes. Abdominal aorta normal in caliber.  LYMPH NODES: No lymphadenopathy.  ABDOMINAL WALL: Tiny fat-containing umbilical hernia  BONES: No aggressive osseous lesion.    IMPRESSION:    Urinary bladder collapsed around a Parker catheter, limiting evaluation,   with infiltration of the perivesicular fat; correlation is recommended   for cystitis.    Enlarged prostate with subtle areas of low density at the anterior aspect   of the prostate, 2 on the right and one on the left with the largest   measuring 2.1 x0.6 cm; prostate abscesses cannot be excluded.    A/P  84 Y/O male admitted with syncope vs TIA vs CVA. Patient with parker catheter following recent episode of UR at [facility] with catheter exchanged in ED upon arrival. Patient seen and  examined at bedside resting comfortably.   Offers no complaints, denies any pain.   Denies fever, chills, N/V/D, CP, SOB.     Vital Signs Last 24 Hrs  T(F): 98.6 (06-21-25 @ 16:15), Max: 98.9 (06-20-25 @ 20:00)  HR: 93 (06-21-25 @ 16:15)  BP: 137/72 (06-21-25 @ 16:15)  RR: 18 (06-21-25 @ 16:15)  SpO2: 96% (06-21-25 @ 16:15)    PHYSICAL EXAM:  GENERAL: Alert, NAD  CHEST/LUNG: Clear to auscultation bilaterally, respirations nonlabored  HEART: Regular rate and rhythm; S1 & S2 appreciated  ABDOMEN: + Bowel sounds, soft, Nontender, Nondistended  : no suprapubic tenderness or distention. Indwelling catheter draining clear, yellow urine   EXTREMITIES:  no calf tenderness, No edema    I&O's Detail    20 Jun 2025 07:01  -  21 Jun 2025 07:00  --------------------------------------------------------  IN:    Oral Fluid: 640 mL    sodium chloride 0.9%: 150 mL  Total IN: 790 mL    OUT:    Indwelling Catheter - Urethral (mL): 300 mL  Total OUT: 300 mL    Total NET: 490 mL      21 Jun 2025 07:01  -  21 Jun 2025 19:37  --------------------------------------------------------  IN:    Oral Fluid: 640 mL  Total IN: 640 mL    OUT:  Total OUT: 0 mL    Total NET: 640 mL          LABS:                        13.6   5.99  )-----------( 123      ( 21 Jun 2025 07:10 )             38.9     06-21    141  |  110[H]  |  18  ----------------------------<  104[H]  3.8   |  27  |  1.20    Ca    9.5      21 Jun 2025 07:10    TPro  6.6  /  Alb  3.1[L]  /  TBili  1.0  /  DBili  x   /  AST  19  /  ALT  21  /  AlkPhos  63  06-21    RADIOLOGY & ADDITIONAL STUDIES:  < from: CT Abdomen and Pelvis No Cont (06.20.25 @ 10:10) >  ACC: [id number] EXAM:  CT ABDOMEN AND PELVIS   ORDERED BY: TIM MAO     PROCEDURE DATE:  06/20/2025          INTERPRETATION:  CLINICAL INFORMATION: Evaluate prostate size.    COMPARISON: None.    CONTRAST/COMPLICATIONS:  IV Contrast: NONE  Oral Contrast: NONE.    PROCEDURE:  CT of the Abdomen and Pelvis was performed.  Sagittal and coronal reformats were performed.    FINDINGS:  LOWER CHEST: Partially visualized bilateral lower lobe groundglass   opacities, likely atelectasis.    LIVER: Within normal limits.  BILE DUCTS: Normal caliber.  GALLBLADDER: Limited secondary to streak artifact. No wall thickening.   Probable fundal adenomyomatosis.  SPLEEN: Within normal limits.  PANCREAS: Within normal limits.  ADRENALS: Within normal limits.  KIDNEYS/URETERS: No renal stones or hydronephrosis. Low-attenuation   lesions in left kidney, incomplete characterized on this study.    BLADDER: Collapsed around a Parker catheter, limiting evaluation. Mild   infiltration of the perivesicular fat. Air in urinary bladder compatible   with the instrumentation.  REPRODUCTIVE ORGANS: Enlarged measuring 5.5 x 5.3 x 6.3 cm (transverse x   anteroposterior x craniocaudad) for a volume of 96 mL. Curvilinear low   density at the anterior aspect of the prostate on the right in the base   to mid gland measuring 2.1 x 0.6 cm with two additional smaller subtle   areas of low density anteriorly, one on the right and one on the left   (series 2 image 108); evaluation is limited on this noncontrast study,   however small prostate abscesses cannot be excluded.    BOWEL: No bowel obstruction. Appendix is not visualized. No evidence of   inflammation in the pericecal region.  PERITONEUM/RETROPERITONEUM: No ascites.  VESSELS: Mild atherosclerotic changes. Abdominal aorta normal in caliber.  LYMPH NODES: No lymphadenopathy.  ABDOMINAL WALL: Tiny fat-containing umbilical hernia  BONES: No aggressive osseous lesion.    IMPRESSION:    Urinary bladder collapsed around a Parker catheter, limiting evaluation,   with infiltration of the perivesicular fat; correlation is recommended   for cystitis.    Enlarged prostate with subtle areas of low density at the anterior aspect   of the prostate, 2 on the right and one on the left with the largest   measuring 2.1 x0.6 cm; prostate abscesses cannot be excluded.    A/P  84 Y/O male admitted with syncope vs TIA vs CVA. Patient with parker catheter following recent episode of UR at [facility] with catheter exchanged in ED upon arrival.   CT reveals enlarged prostate with subtle areas of low density in the anterior aspect of prostate, abscess cannot be excluded.   Patient without signs of infection. No fevers, normal WBC.   Received 3 days of Rocephin.   PSA 6.1   - continue catheter, d/c with parker  - OP f/u with Dr. Durant  - continue flomax/finasteride   - stable for dc from urology standpoint   - continue care per primary team   - d/w Dr. Durant

## 2025-06-21 NOTE — DISCHARGE NOTE PROVIDER - NSDCFUADDAPPT_GEN_ALL_CORE_FT
Call to make an appointment with your PCP and urologist for follow up and review hospital course  Also make an appointment with Neurologist

## 2025-06-21 NOTE — DISCHARGE NOTE PROVIDER - CARE PROVIDER_API CALL
Jose Tierney  Phone: (   )    -  Fax: (   )    -  Follow Up Time: 1 week    Jean-Pierre Durant.  Urology  [address] [place] [address]  Phone: [phone]  Fax: [phone]  Follow Up Time: 1 week

## 2025-06-21 NOTE — DISCHARGE NOTE PROVIDER - NSTOBACCOUSAGEY/N_GEN_A_CS
Pt A & Ox4, AVSS. Incisions CDI. Pt discharged home by son. All pts belongings went home with pt. Reviewed discharge handout and meds with pt and all questions answered. Pt discharged home with oxy and augmentin.    No

## 2025-06-21 NOTE — DISCHARGE NOTE PROVIDER - NSDCMRMEDTOKEN_GEN_ALL_CORE_FT
amLODIPine 10 mg oral tablet: 1 tab(s) orally once a day  hydrALAZINE 100 mg oral tablet: 1 tab(s) orally 2 times a day  lisinopril 40 mg oral tablet: 1 tab(s) orally once a day   amLODIPine 10 mg oral tablet: 1 tab(s) orally once a day  aspirin 81 mg oral delayed release tablet: 1 tab(s) orally once a day  atorvastatin 40 mg oral tablet: 1 tab(s) orally once a day (at bedtime)  carvedilol 6.25 mg oral tablet: 1 tab(s) orally every 12 hours  finasteride 5 mg oral tablet: 1 tab(s) orally once a day  hydrALAZINE 100 mg oral tablet: 1 tab(s) orally 2 times a day  lisinopril 40 mg oral tablet: 1 tab(s) orally once a day  outpatient physical therapy: diagnose &amp; treat  tamsulosin 0.4 mg oral capsule: 2 cap(s) orally once a day (at bedtime)

## 2025-06-21 NOTE — DISCHARGE NOTE PROVIDER - CARE PROVIDERS DIRECT ADDRESSES
,DirectAddress_Unknown,[email or web address]

## 2025-06-22 LAB
ANION GAP SERPL CALC-SCNC: 5 MMOL/L — SIGNIFICANT CHANGE UP (ref 5–17)
BUN SERPL-MCNC: 18 MG/DL — SIGNIFICANT CHANGE UP (ref 7–23)
CALCIUM SERPL-MCNC: 9.1 MG/DL — SIGNIFICANT CHANGE UP (ref 8.5–10.1)
CHLORIDE SERPL-SCNC: 106 MMOL/L — SIGNIFICANT CHANGE UP (ref 96–108)
CO2 SERPL-SCNC: 30 MMOL/L — SIGNIFICANT CHANGE UP (ref 22–31)
CREAT SERPL-MCNC: 1.3 MG/DL — SIGNIFICANT CHANGE UP (ref 0.5–1.3)
EGFR: 53 ML/MIN/1.73M2 — LOW
EGFR: 53 ML/MIN/1.73M2 — LOW
GLUCOSE SERPL-MCNC: 98 MG/DL — SIGNIFICANT CHANGE UP (ref 70–99)
HCT VFR BLD CALC: 36.7 % — LOW (ref 39–50)
HGB BLD-MCNC: 13 G/DL — SIGNIFICANT CHANGE UP (ref 13–17)
MCHC RBC-ENTMCNC: 27.2 PG — SIGNIFICANT CHANGE UP (ref 27–34)
MCHC RBC-ENTMCNC: 35.4 G/DL — SIGNIFICANT CHANGE UP (ref 32–36)
MCV RBC AUTO: 76.8 FL — LOW (ref 80–100)
NRBC BLD AUTO-RTO: 0 /100 WBCS — SIGNIFICANT CHANGE UP (ref 0–0)
PLATELET # BLD AUTO: 125 K/UL — LOW (ref 150–400)
POTASSIUM SERPL-MCNC: 4.1 MMOL/L — SIGNIFICANT CHANGE UP (ref 3.5–5.3)
POTASSIUM SERPL-SCNC: 4.1 MMOL/L — SIGNIFICANT CHANGE UP (ref 3.5–5.3)
RBC # BLD: 4.78 M/UL — SIGNIFICANT CHANGE UP (ref 4.2–5.8)
RBC # FLD: 14.9 % — HIGH (ref 10.3–14.5)
SODIUM SERPL-SCNC: 141 MMOL/L — SIGNIFICANT CHANGE UP (ref 135–145)
WBC # BLD: 5.76 K/UL — SIGNIFICANT CHANGE UP (ref 3.8–10.5)
WBC # FLD AUTO: 5.76 K/UL — SIGNIFICANT CHANGE UP (ref 3.8–10.5)

## 2025-06-22 PROCEDURE — 99232 SBSQ HOSP IP/OBS MODERATE 35: CPT

## 2025-06-22 RX ADMIN — TAMSULOSIN HYDROCHLORIDE 0.8 MILLIGRAM(S): 0.4 CAPSULE ORAL at 21:34

## 2025-06-22 RX ADMIN — FINASTERIDE 5 MILLIGRAM(S): 1 TABLET, FILM COATED ORAL at 11:17

## 2025-06-22 RX ADMIN — LISINOPRIL 40 MILLIGRAM(S): 5 TABLET ORAL at 06:39

## 2025-06-22 RX ADMIN — HEPARIN SODIUM 5000 UNIT(S): 1000 INJECTION INTRAVENOUS; SUBCUTANEOUS at 13:17

## 2025-06-22 RX ADMIN — NYSTATIN 1 APPLICATION(S): 100000 CREAM TOPICAL at 06:39

## 2025-06-22 RX ADMIN — HEPARIN SODIUM 5000 UNIT(S): 1000 INJECTION INTRAVENOUS; SUBCUTANEOUS at 21:34

## 2025-06-22 RX ADMIN — AMLODIPINE BESYLATE 10 MILLIGRAM(S): 10 TABLET ORAL at 06:39

## 2025-06-22 RX ADMIN — ATORVASTATIN CALCIUM 40 MILLIGRAM(S): 80 TABLET, FILM COATED ORAL at 21:35

## 2025-06-22 RX ADMIN — NYSTATIN 1 APPLICATION(S): 100000 CREAM TOPICAL at 17:21

## 2025-06-22 RX ADMIN — Medication 100 MILLIGRAM(S): at 06:38

## 2025-06-22 RX ADMIN — Medication 81 MILLIGRAM(S): at 11:17

## 2025-06-22 RX ADMIN — HEPARIN SODIUM 5000 UNIT(S): 1000 INJECTION INTRAVENOUS; SUBCUTANEOUS at 06:37

## 2025-06-22 RX ADMIN — Medication 2 TABLET(S): at 21:34

## 2025-06-22 RX ADMIN — Medication 100 MILLIGRAM(S): at 17:21

## 2025-06-22 NOTE — PROGRESS NOTE ADULT - SUBJECTIVE AND OBJECTIVE BOX
Subjective  Patient seen and examined at bedside. Spoke to patient via Creole , Emmettconsuelo, ID #[id number]  Patient reports he is well and does not have any new complaints.   Denies dizziness.     Objective    Vital signs  T(F): , Max: 98.5 (06-21-25 @ 23:29)  HR: 89 (06-22-25 @ 16:10)  BP: 143/76 (06-22-25 @ 16:10)  SpO2: 94% (06-22-25 @ 16:10)  Wt(kg): --    Output     06-21 @ 07:01  -  06-22 @ 07:00  --------------------------------------------------------  IN: 640 mL / OUT: 1700 mL / NET: -1060 mL    06-22 @ 07:01  -  06-22 @ 19:52  --------------------------------------------------------  IN: 640 mL / OUT: 700 mL / NET: -60 mL        Gen: awake alert nad axox3  Cardio: RRR, no lower extremity edema.   Pulm: Breathing normally.  Abd: soft, nontender      Labs                          13.0   5.76  )-----------( 125      ( 22 Jun 2025 14:47 )             36.7   06-22    141  |  106  |  18  ----------------------------<  98  4.1   |  30  |  1.30    Ca    9.1      22 Jun 2025 14:47    TPro  6.6  /  Alb  3.1[L]  /  TBili  1.0  /  DBili  x   /  AST  19  /  ALT  21  /  AlkPhos  63  06-21

## 2025-06-22 NOTE — DIETITIAN INITIAL EVALUATION ADULT - OTHER INFO
Robyn Myres  ID [id number] assisted with assessment interview. Pt seen working on lunch, unable to recall UBW nor report any recent weight loss and stated height of 6'0". Pt stated lives with daughter and son-in-law who prepare all meals for him, consumes 2 meals a day, sometimes snacks and daughter does leave food in fridge for pt to just warm up when he wants to eat. Pt stated currently eating great, enjoys the food and complimented  staff on cooking delicious food. Pt agreeable to kami Valencia during this admission and this writer encouraged pt to have family purchase oral nutritional supplements to have on hand and to be consumed with meals. Pt verbalized understanding.

## 2025-06-22 NOTE — DIETITIAN INITIAL EVALUATION ADULT - PERTINENT LABORATORY DATA
06-21    141  |  110[H]  |  18  ----------------------------<  104[H]  3.8   |  27  |  1.20    Ca    9.5      21 Jun 2025 07:10    TPro  6.6  /  Alb  3.1[L]  /  TBili  1.0  /  DBili  x   /  AST  19  /  ALT  21  /  AlkPhos  63  06-21  A1C with Estimated Average Glucose Result: 5.5 % (06-18-25 @ 08:26)

## 2025-06-22 NOTE — DIETITIAN INITIAL EVALUATION ADULT - PERTINENT MEDS FT
MEDICATIONS  (STANDING):  amLODIPine   Tablet 10 milliGRAM(s) Oral daily  aspirin  chewable 81 milliGRAM(s) Oral daily  atorvastatin 40 milliGRAM(s) Oral at bedtime  finasteride 5 milliGRAM(s) Oral daily  heparin   Injectable 5000 Unit(s) SubCutaneous every 8 hours  hydrALAZINE 100 milliGRAM(s) Oral two times a day  lisinopril 40 milliGRAM(s) Oral daily  nystatin Powder 1 Application(s) Topical two times a day  senna 2 Tablet(s) Oral at bedtime  sodium chloride 0.9%. 1000 milliLiter(s) (75 mL/Hr) IV Continuous <Continuous>  tamsulosin 0.8 milliGRAM(s) Oral at bedtime    MEDICATIONS  (PRN):  acetaminophen     Tablet .. 650 milliGRAM(s) Oral every 6 hours PRN Temp greater or equal to 38C (100.4F), Mild Pain (1 - 3)  aluminum hydroxide/magnesium hydroxide/simethicone Suspension 30 milliLiter(s) Oral every 4 hours PRN Dyspepsia  melatonin 3 milliGRAM(s) Oral at bedtime PRN Insomnia  ondansetron Injectable 4 milliGRAM(s) IV Push every 8 hours PRN Nausea and/or Vomiting

## 2025-06-22 NOTE — DIETITIAN INITIAL EVALUATION ADULT - NS FNS DIET ORDER
Diet, DASH/TLC:   Sodium & Cholesterol Restricted  Supplement Feeding Modality:  Oral  Ensure Plus High Protein Cans or Servings Per Day:  2       Frequency:  Two Times a day (06-22-25 @ 13:55) [Pending Verification By Attending]  Diet, Regular:   DASH/TLC {Sodium & Cholesterol Restricted} (06-17-25 @ 19:01) [Active]

## 2025-06-23 ENCOUNTER — TRANSCRIPTION ENCOUNTER (OUTPATIENT)
Age: 85
End: 2025-06-23

## 2025-06-23 VITALS
HEART RATE: 86 BPM | RESPIRATION RATE: 17 BRPM | DIASTOLIC BLOOD PRESSURE: 81 MMHG | SYSTOLIC BLOOD PRESSURE: 158 MMHG | OXYGEN SATURATION: 97 % | TEMPERATURE: 99 F

## 2025-06-23 PROCEDURE — 99239 HOSP IP/OBS DSCHRG MGMT >30: CPT

## 2025-06-23 RX ORDER — FINASTERIDE 1 MG/1
1 TABLET, FILM COATED ORAL
Qty: 30 | Refills: 0
Start: 2025-06-23

## 2025-06-23 RX ORDER — CARVEDILOL 3.12 MG/1
6.25 TABLET, FILM COATED ORAL EVERY 12 HOURS
Refills: 0 | Status: DISCONTINUED | OUTPATIENT
Start: 2025-06-23 | End: 2025-06-23

## 2025-06-23 RX ORDER — CARVEDILOL 3.12 MG/1
1 TABLET, FILM COATED ORAL
Qty: 60 | Refills: 0
Start: 2025-06-23

## 2025-06-23 RX ORDER — ASPIRIN 325 MG
1 TABLET ORAL
Qty: 30 | Refills: 0
Start: 2025-06-23

## 2025-06-23 RX ORDER — ATORVASTATIN CALCIUM 80 MG/1
1 TABLET, FILM COATED ORAL
Qty: 30 | Refills: 0
Start: 2025-06-23

## 2025-06-23 RX ORDER — TAMSULOSIN HYDROCHLORIDE 0.4 MG/1
2 CAPSULE ORAL
Qty: 60 | Refills: 0
Start: 2025-06-23

## 2025-06-23 RX ADMIN — Medication 100 MILLIGRAM(S): at 14:34

## 2025-06-23 RX ADMIN — CARVEDILOL 6.25 MILLIGRAM(S): 3.12 TABLET, FILM COATED ORAL at 09:34

## 2025-06-23 RX ADMIN — HEPARIN SODIUM 5000 UNIT(S): 1000 INJECTION INTRAVENOUS; SUBCUTANEOUS at 05:10

## 2025-06-23 RX ADMIN — CARVEDILOL 6.25 MILLIGRAM(S): 3.12 TABLET, FILM COATED ORAL at 17:17

## 2025-06-23 RX ADMIN — NYSTATIN 1 APPLICATION(S): 100000 CREAM TOPICAL at 05:12

## 2025-06-23 RX ADMIN — Medication 81 MILLIGRAM(S): at 12:19

## 2025-06-23 RX ADMIN — FINASTERIDE 5 MILLIGRAM(S): 1 TABLET, FILM COATED ORAL at 12:19

## 2025-06-23 RX ADMIN — Medication 100 MILLIGRAM(S): at 05:08

## 2025-06-23 RX ADMIN — AMLODIPINE BESYLATE 10 MILLIGRAM(S): 10 TABLET ORAL at 05:08

## 2025-06-23 RX ADMIN — LISINOPRIL 40 MILLIGRAM(S): 5 TABLET ORAL at 05:08

## 2025-06-23 RX ADMIN — HEPARIN SODIUM 5000 UNIT(S): 1000 INJECTION INTRAVENOUS; SUBCUTANEOUS at 14:34

## 2025-06-23 NOTE — PROGRESS NOTE ADULT - SUBJECTIVE AND OBJECTIVE BOX
PROGRESS NOTE:     Patient is a 85y old  Male who presents with a chief complaint of Syncope vs. TIA vs. CVA (22 Jun 2025 19:51)          SUBJECTIVE & OBJECTIVE:   Pt seen and examined at bedside in AM. Creole interpretor used [id number]    no overnight events.       REVIEW OF SYSTEMS: remaining ROS negative     PHYSICAL EXAM:  VITALS:  Vital Signs Last 24 Hrs  T(C): 36.9 (23 Jun 2025 10:35), Max: 36.9 (22 Jun 2025 23:33)  T(F): 98.5 (23 Jun 2025 10:35), Max: 98.5 (22 Jun 2025 23:33)  HR: 95 (23 Jun 2025 10:35) (78 - 95)  BP: 137/71 (23 Jun 2025 10:35) (137/71 - 173/89)  BP(mean): 117 (23 Jun 2025 04:19) (98 - 117)  RR: 17 (23 Jun 2025 10:35) (17 - 18)  SpO2: 96% (23 Jun 2025 10:35) (93% - 96%)    Parameters below as of 23 Jun 2025 08:40  Patient On (Oxygen Delivery Method): room air          GENERAL: NAD,  no increased WOB  HEAD:  Atraumatic, Normocephalic  EYES: EOMI,  conjunctiva and sclera clear  ENMT: Moist mucous membranes  NECK: Supple, No JVD  NERVOUS SYSTEM:  Alert & Oriented   CHEST/LUNG: Clear to auscultation bilaterally; No rales, rhonchi, wheezing  HEART: Regular rate and rhythm  ABDOMEN: Soft, Nontender, Nondistended; Bowel sounds present  EXTREMITIES:   No clubbing, cyanosis      MEDICATIONS  (STANDING):  amLODIPine   Tablet 10 milliGRAM(s) Oral daily  aspirin  chewable 81 milliGRAM(s) Oral daily  atorvastatin 40 milliGRAM(s) Oral at bedtime  carvedilol 6.25 milliGRAM(s) Oral every 12 hours  finasteride 5 milliGRAM(s) Oral daily  heparin   Injectable 5000 Unit(s) SubCutaneous every 8 hours  hydrALAZINE 100 milliGRAM(s) Oral three times a day  lisinopril 40 milliGRAM(s) Oral daily  nystatin Powder 1 Application(s) Topical two times a day  senna 2 Tablet(s) Oral at bedtime  tamsulosin 0.8 milliGRAM(s) Oral at bedtime    MEDICATIONS  (PRN):  acetaminophen     Tablet .. 650 milliGRAM(s) Oral every 6 hours PRN Temp greater or equal to 38C (100.4F), Mild Pain (1 - 3)  aluminum hydroxide/magnesium hydroxide/simethicone Suspension 30 milliLiter(s) Oral every 4 hours PRN Dyspepsia  melatonin 3 milliGRAM(s) Oral at bedtime PRN Insomnia  ondansetron Injectable 4 milliGRAM(s) IV Push every 8 hours PRN Nausea and/or Vomiting      Allergies    No Known Allergies    Intolerances              LABS:                           13.0   5.76  )-----------( 125      ( 22 Jun 2025 14:47 )             36.7     06-22    141  |  106  |  18  ----------------------------<  98  4.1   |  30  |  1.30    Ca    9.1      22 Jun 2025 14:47        Urinalysis Basic - ( 22 Jun 2025 14:47 )    Color: x / Appearance: x / SG: x / pH: x  Gluc: 98 mg/dL / Ketone: x  / Bili: x / Urobili: x   Blood: x / Protein: x / Nitrite: x   Leuk Esterase: x / RBC: x / WBC x   Sq Epi: x / Non Sq Epi: x / Bacteria: x      CAPILLARY BLOOD GLUCOSE                    RECENT CULTURES:          RADIOLOGY & ADDITIONAL TESTS:

## 2025-06-23 NOTE — PROGRESS NOTE ADULT - PROVIDER SPECIALTY LIST ADULT
Urology
Hospitalist
Infectious Disease
Neurology
Hospitalist
Neurology
Urology
Hospitalist
Hospitalist
Neurology

## 2025-06-23 NOTE — PROGRESS NOTE ADULT - ASSESSMENT
85 year old male with PMHx of HTN and urinary retention (with indwelling Lind catheter) who presented to the ED on 6/17/25 for complaints of passing out and inability to speak and admitted for syncope vs. TIA vs. CVA.      Syncope vs. TIA vs. CVA  In pt with recent admission for left eye blurry vision and headache x 9 days, hospitalized at [facility], CT head, CTA H/N, TTE, and MRI brain were all unremarkable  Patient yelled out for help while in the bathroom, passed out, did not fall as family caught him  Then regained consciousness and did not speak but had eyes open, no reported tongue biting, urinary or bowel incontinence  Patient himself does not recall episode, symptoms resolved while in the ER  NIHSS 9 as per ER physician documentation, not TNK candidate given improved symptoms  CT head without acute intracranial findings  CTA head and neck without hemodynamically significant stenosis  CT brain perfusion demonstrates delayed mean transit time in the medial L. frontal lobe with mismatched volume of 5 mL, ischemic penumbra not definitely excluded  ASA 81 mg and atorvastatin 40 mg started  PT/OT consulted- outpt PT  Neuro recs appreciated - unclear if related to addition of HCTZ vs infectious vs metabolix (GAMA)  MRI-H reviewed  EEG read Neg   DC planning    #chronic indwelling catheter r/o CAUTI; elevated PSA  Underwent Lind catheter placement for acute urinary retention during recent hospitalization at [facility]  U/A (obtained from new Lind) with proteinuria, small leuks, negative nitrites, large blood, WBC 10, RBC 35, many bacteria, squamous epithelial cells present  - Appreciate ID recs- d/c CTX  - Urology recs appreciated -- continue flomax/finasteride, when patient is discharged home he should keep the Lind in place and follow up with Urologist, Dr. Durant, as an outpatient.   - F/u Ucx NGTD     GAMA - resolved  likely pre-renal  Avoid nephrotoxins, renally dose meds  Encourage PO hydration  Monitor renal function    Chronic medical conditions:  HTN, uncontrolled: BP as elevated as 174/86 on admission, PTA hydralazine 100 mg BID, amlodipine 10 mg, lisinopril 40 mg held due to allowing for permissive HTN, recent dc summary states was recommended to start on HCTZ but patient is not on it--his blood pressure has been controlled without HCTZ.  Start on coreg 5.25  Thrombocytopenia, chronic: plts 133K on admission, unknown baseline but plts 114K (on [facility] H&P on 6/15/25), no signs of active bleeding    DVT ppx- heparin ppx  PT eval- O/p PT  
85 year old male with PMHx of HTN and urinary retention (with indwelling Lind catheter) who presented to the ED on 6/17/25 for complaints of passing out and inability to speak. No tongue bite, incontinence   Recent admission to [facility]  for L eye blurry vision, HA and elevted BP  HCTZ recently added at [facility]   CTH neg, CTA no stenosis  CTP with perfusion deficit in frontal lobe   Labs with elevated WBC, + UA, GAMA  MRI brain neg, shows atrophy and chronic ischemic changes    Syncope - unclear if related to addition of HCTZ vs infectious vs metabolix (GAMA), would r/o seizure due to L frontal lobe perfusion deficit and transient aphasia    - routine eeg was normal, sinus tach noted  - seizure and fall precuations  - tte, tele monitoring - not yet done, can do as outpt  - consider d/c HCTZ for alt agent (non diuretic)  - abx per ID for  UTI  - gradual normotension  - check orthostatics  - pt/ot  - dvt ppx   dc planning    
Saintony Guerrier is an 85 year old male with PMHx of HTN and urinary retention (with indwelling Lind catheter) who presented to the ED on 6/17/25 for complaints of passing out and inability to speak and admitted for syncope vs. TIA vs. CVA.    Syncope vs. TIA vs. CVA  In pt with recent admission for left eye blurry vision and headache x 9 days, hospitalized at [facility], CT head, CTA H/N, TTE, and MRI brain were all unremarkable  Patient yelled out for help while in the bathroom, passed out, did not fall as family caught him  Then regained consciousness and did not speak but had eyes open, no reported tongue biting, urinary or bowel incontinence  Patient himself does not recall episode, symptoms resolved while in the ER  NIHSS 9 as per ER physician documentation, not TNK candidate given improved symptoms  CT head without acute intracranial findings  CTA head and neck without hemodynamically significant stenosis  CT brain perfusion demonstrates delayed mean transit time in the medial L. frontal lobe with mismatched volume of 5 mL, ischemic penumbra not definitely excluded  Neuro checks q4, diet started given passed bedside dysphagia eval as per ER RN  ASA 81 mg and atorvastatin 40 mg started  Telemetry to monitor for arrhythmias  PT/OT consulted  Neuro recs appreciated  MRI-H reviewed  EEG read pending    #chronic indwelling catheter r/o CAUTI  Underwent Lind catheter placement for acute urinary retention during recent hospitalization at [facility]  U/A (obtained from new Lind) with proteinuria, small leuks, negative nitrites, large blood, WBC 10, RBC 35, many bacteria, squamous epithelial cells present  - Appreciate ID recs- d/c CTX  - Urology recs appreciated  - CT A/P reviewed  - F/u Ucx NGTD and PSA elevated    GAMA  likely pre-renal  C/w maintenance IVF  Avoid nephrotoxins, renally dose meds  Encourage PO hydration  Monitor renal function    Chronic medical conditions:  HTN, uncontrolled: BP as elevated as 174/86 on admission, PTA hydralazine 100 mg BID, amlodipine 10 mg, lisinopril 40 mg held due to allowing for permissive HTN, recent dc summary states was recommended to start on HCTZ but patient is not on it?  Thrombocytopenia, chronic: plts 133K on admission, unknown baseline but plts 114K (on [facility] H&P on 6/15/25), no signs of active bleeding    DVT ppx- heparin ppx  PT eval- O/p PT  Dispo -pending improvement in renal function
Saintony Guerrier is an 85 year old male with PMHx of HTN and urinary retention (with indwelling Lind catheter) who presented to the ED on 6/17/25 for complaints of passing out and inability to speak and admitted for syncope vs. TIA vs. CVA.    Syncope vs. TIA vs. CVA  In pt with recent admission for left eye blurry vision and headache x 9 days, hospitalized at [facility], CT head, CTA H/N, TTE, and MRI brain were all unremarkable  Patient yelled out for help while in the bathroom, passed out, did not fall as family caught him  Then regained consciousness and did not speak but had eyes open, no reported tongue biting, urinary or bowel incontinence  Patient himself does not recall episode, symptoms resolved while in the ER  NIHSS 9 as per ER physician documentation, not TNK candidate given improved symptoms  CT head without acute intracranial findings  CTA head and neck without hemodynamically significant stenosis  CT brain perfusion demonstrates delayed mean transit time in the medial L. frontal lobe with mismatched volume of 5 mL, ischemic penumbra not definitely excluded  Neuro checks q4, diet started given passed bedside dysphagia eval as per ER RN  ASA 81 mg and atorvastatin 40 mg started  Allowing for permissive HTN up to 220/120 for first 24-48 hours, DVT ppx with SCDs for now  F/u TSH, lipid panel, A1c for risk stratification  F/u MRI brain as recommended by neurology  Telemetry to monitor for arrhythmias  PT/OT consulted  Neuro recs appreciated  MRI-H reviewed    #chronic indwelling catheter r/o CAUTI  Underwent Lind catheter placement for acute urinary retention during recent hospitalization at [facility]  U/A (obtained from new Lind) with proteinuria, small leuks, negative nitrites, large blood, WBC 10, RBC 35, many bacteria, squamous epithelial cells present  Ceftriaxone 1 g IV started   F/u urine culture  Urology c/s placed fro SPC evaluation  Consider voiding trial prior to discharge    GAMA  Cr 1.53 on admission, unknown baseline Cr  S/p 1L NS bolus in the ED  Avoid nephrotoxins, renally dose meds  Encourage PO hydration  Monitor renal function    Chronic medical conditions:  HTN, uncontrolled: BP as elevated as 174/86 on admission, PTA hydralazine 100 mg BID, amlodipine 10 mg, lisinopril 40 mg held due to allowing for permissive HTN, recent dc summary states was recommended to start on HCTZ but patient is not on it?  Thrombocytopenia, chronic: plts 133K on admission, unknown baseline but plts 114K (on [facility] H&P on 6/15/25), no signs of active bleeding    Medication reconciliation completed using med list provided by patient. 
85 year old male with PMHx of HTN and urinary retention (with indwelling Lind catheter) who presented to the ED on 6/17/25 for complaints of passing out and inability to speak. No tongue bite, incontinence   Recent admission to [facility]  for L eye blurry vision, HA and elevted BP  HCTZ recently added at [facility]   CTH neg, CTA no stenosis  CTP with perfusion deficit in frontal lobe   Labs with elevated WBC, + UA, GAMA  MRI brain neg, shows atrophy and chronic ischemic changes    Syncope - unclear if related to addition of HCTZ vs infectious vs metabolix (GAMA), would r/o seizure due to L frontal lobe perfusion deficit and transient aphasia    - routine eeg  - seizure and fall precuations  - tte  - consider d/c HCTZ for alt agent (non diuretic)  - abx per ID for  UTI  - gradual normotension  - check orthostatics  - pt/ot  - dvt ppx       
Saintony Guerrier is an 85 year old male with PMHx of HTN and urinary retention (with indwelling Lind catheter) who presented to the ED on 6/17/25 for complaints of passing out and inability to speak and admitted for syncope vs. TIA vs. CVA.    Syncope vs. TIA vs. CVA  In pt with recent admission for left eye blurry vision and headache x 9 days, hospitalized at [facility], CT head, CTA H/N, TTE, and MRI brain were all unremarkable  Patient yelled out for help while in the bathroom, passed out, did not fall as family caught him  Then regained consciousness and did not speak but had eyes open, no reported tongue biting, urinary or bowel incontinence  Patient himself does not recall episode, symptoms resolved while in the ER  NIHSS 9 as per ER physician documentation, not TNK candidate given improved symptoms  CT head without acute intracranial findings  CTA head and neck without hemodynamically significant stenosis  CT brain perfusion demonstrates delayed mean transit time in the medial L. frontal lobe with mismatched volume of 5 mL, ischemic penumbra not definitely excluded  Neuro checks q4, diet started given passed bedside dysphagia eval as per ER RN  ASA 81 mg and atorvastatin 40 mg started  Allowing for permissive HTN up to 220/120 for first 24-48 hours, DVT ppx with SCDs for now  F/u TSH, lipid panel, A1c for risk stratification  F/u MRI brain as recommended by neurology  Telemetry to monitor for arrhythmias  PT/OT consulted  Neuro c/s placed  MRI-H reviewed    CAUTI  Underwent Lind catheter placement for acute urinary retention during recent hospitalization at [facility]  U/A (obtained from new Lind) with proteinuria, small leuks, negative nitrites, large blood, WBC 10, RBC 35, many bacteria, squamous epithelial cells present  Ceftriaxone 1 g IV started   F/u urine culture  Consider voiding trial prior to discharge    GAMA  Cr 1.53 on admission, unknown baseline Cr  S/p 1L NS bolus in the ED  Avoid nephrotoxins, renally dose meds  Encourage PO hydration  Monitor renal function    Chronic medical conditions:  HTN, uncontrolled: BP as elevated as 174/86 on admission, PTA hydralazine 100 mg BID, amlodipine 10 mg, lisinopril 40 mg held due to allowing for permissive HTN, recent dc summary states was recommended to start on HCTZ but patient is not on it?  Thrombocytopenia, chronic: plts 133K on admission, unknown baseline but plts 114K (on [facility] H&P on 6/15/25), no signs of active bleeding    Medication reconciliation completed using med list provided by patient. 
84 Y/O male admitted with syncope vs TIA vs CVA. Patient with parker catheter following recent episode of UR at [facility] with catheter exchanged in ED upon arrival. Creatinine worsened today. Remains afebrile without leukocytosis. UCx from 6/17 with NGTD.     PLAN:     - F/U CTAP read   - Maintain catheter; monitor output quality and quantity   - Continue Flomax/Finasteride   - F/U final UCx   - F/U PSA   - Continue care per primary team   - Discussed with Dr. Durant 
85 year old male with PMHx of HTN and urinary retention (with indwelling Lind catheter) who presented to the ED on 6/17/25 for complaints of passing out and inability to speak and admitted for syncope vs. TIA vs. CVA.    D/C planning -- can likely be discharged tomorrow  Syncope vs. TIA vs. CVA  In pt with recent admission for left eye blurry vision and headache x 9 days, hospitalized at [facility], CT head, CTA H/N, TTE, and MRI brain were all unremarkable  Patient yelled out for help while in the bathroom, passed out, did not fall as family caught him  Then regained consciousness and did not speak but had eyes open, no reported tongue biting, urinary or bowel incontinence  Patient himself does not recall episode, symptoms resolved while in the ER  NIHSS 9 as per ER physician documentation, not TNK candidate given improved symptoms  CT head without acute intracranial findings  CTA head and neck without hemodynamically significant stenosis  CT brain perfusion demonstrates delayed mean transit time in the medial L. frontal lobe with mismatched volume of 5 mL, ischemic penumbra not definitely excluded  Neuro checks q4, diet started given passed bedside dysphagia eval as per ER RN  ASA 81 mg and atorvastatin 40 mg started  Telemetry to monitor for arrhythmias  PT/OT consulted  Neuro recs appreciated -- unclear if related to addition of HCTZ vs infectious vs metabolix (GAMA)  MRI-H reviewed  EEG read Neg     #chronic indwelling catheter r/o CAUTI; elevated PSA  Underwent Lind catheter placement for acute urinary retention during recent hospitalization at [facility]  U/A (obtained from new Lind) with proteinuria, small leuks, negative nitrites, large blood, WBC 10, RBC 35, many bacteria, squamous epithelial cells present  - Appreciate ID recs- d/c CTX  - Urology recs appreciated -- continue flomax/finasteride, when patient is discharged home he should keep the Lind in place and follow up with Urologist, Dr. Durant, as an outpatient.   - F/u Ucx NGTD     GAMA - resolved  likely pre-renal  C/w maintenance IVF  Avoid nephrotoxins, renally dose meds  Encourage PO hydration  Monitor renal function    Chronic medical conditions:  HTN, uncontrolled: BP as elevated as 174/86 on admission, PTA hydralazine 100 mg BID, amlodipine 10 mg, lisinopril 40 mg held due to allowing for permissive HTN, recent dc summary states was recommended to start on HCTZ but patient is not on it--his blood pressure has been controlled without HCTZ.  Thrombocytopenia, chronic: plts 133K on admission, unknown baseline but plts 114K (on [facility] H&P on 6/15/25), no signs of active bleeding    DVT ppx- heparin ppx  PT eval- O/p PT  
85 year old male with PMHx of HTN and urinary retention (with indwelling Lind catheter) who presented to the ED on 6/17/25 for complaints of passing out and inability to speak. No tongue bite, incontinence   Recent admission to [facility]  for L eye blurry vision, HA and elevted BP  HCTZ recently added at [facility]   CTH neg, CTA no stenosis  CTP with perfusion deficit in frontal lobe   Labs with elevated WBC, + UA, GAMA  MRI brain neg, shows atrophy and chronic ischemic changes    Syncope - unclear if related to addition of HCTZ vs infectious vs metabolix (GAMA), would r/o seizure due to L frontal lobe perfusion deficit and transient aphasia    - routine eeg was normal, sinus tach noted  - seizure and fall precuations  - tte, tele monitoring   - consider d/c HCTZ for alt agent (non diuretic)  - abx per ID for  UTI  - gradual normotension  - check orthostatics  - pt/ot  - dvt ppx       
A/P-  85 year old male with PMHx of HTN and urinary retention (with indwelling Parker catheter) who presented to the ED on 6/17/25 for complaints of passing out and inability to speak.     Patient was recently admitted to [facility] for left eye blurry vision, headache, and elevated blood pressure x 9 days. At that time, CT brain without acute intracranial findings. CTA H/N without LVO or stenosis. TTE and MRI brain unremarkable. Evaluated by cardiology for elevated blood pressure who recommended addition of HCTZ. Course complicated by acute urinary retention requiring Parker catheter placement. Planned to follow up with urology for voiding trial. Discharged this morning. When he returned home, family came over to assist him in the bathroom. Patient yelled out for help and passed out. Did not fall as family caught him. Then regained consciousness and did not speak but had eyes open. Does not recall episode. No tongue biting, urinary or bowel incontinence. Baseline functional status is ambulates unassisted and independent with all ADLs. Lives at home with daughter and son.    In the ED, VSS except HR as elevated as 108 and BP as elevated as 174/86. Plts 133K, Cr 1.53. U/A (obtained from old Parker) with trace leuks, negative nitrites, moderate blood, WBC 15, RBC 20, moderate bacteria, squamous epithelial cells present. U/A (obtained from new Parker) with proteinuria, small leuks, negative nitrites, large blood, WBC 10, RBC 35, many bacteria, squamous epithelial cells present. CT head reported  without acute intracranial findings. CTA head and neck reported  without hemodynamically significant stenosis. CT brain perfusion demonstrates delayed mean transit time in the medial left frontal lobe with mismatched volume of 5 mL. Ischemic penumbra not definitely excluded. Received 1L NS bolus. Requested ER physician to contact neurology, who recommends MRI brain. (17 Jun 2025 23:04)      Afebrile  no leukocytosis  urinary retention with parker cath since recent hospitalization  UA- pos for LE and bacteria but negative for nitrates  cx-pending    6/19: no fever, RA,  no leukocytosis, Cr normalized, LFTs ok, MR head - no acute events, UC with no growth, Ceftriaxone IV continued.     Impression  possible syncopal episode   chronic indwelling catheter r/o CAUTI  HTN      Plan-  continue IV Ceftriaxone (day #2)  advise short course  Urology evaluation is pending  rest of the medical management as per medicine team.      discussed with Dr. Gay
Saintony Guerrier is an 85 year old male with PMHx of HTN and urinary retention (with indwelling Lind catheter) who presented to the ED on 6/17/25 for complaints of passing out and inability to speak and admitted for syncope vs. TIA vs. CVA.      D/C  planning   Syncope vs. TIA vs. CVA  In pt with recent admission for left eye blurry vision and headache x 9 days, hospitalized at [facility], CT head, CTA H/N, TTE, and MRI brain were all unremarkable  Patient yelled out for help while in the bathroom, passed out, did not fall as family caught him  Then regained consciousness and did not speak but had eyes open, no reported tongue biting, urinary or bowel incontinence  Patient himself does not recall episode, symptoms resolved while in the ER  NIHSS 9 as per ER physician documentation, not TNK candidate given improved symptoms  CT head without acute intracranial findings  CTA head and neck without hemodynamically significant stenosis  CT brain perfusion demonstrates delayed mean transit time in the medial L. frontal lobe with mismatched volume of 5 mL, ischemic penumbra not definitely excluded  Neuro checks q4, diet started given passed bedside dysphagia eval as per ER RN  ASA 81 mg and atorvastatin 40 mg started  Telemetry to monitor for arrhythmias  PT/OT consulted  Neuro recs appreciated  MRI-H reviewed  EEG read Neg     #chronic indwelling catheter r/o CAUTI  Underwent Lind catheter placement for acute urinary retention during recent hospitalization at [facility]  U/A (obtained from new Lind) with proteinuria, small leuks, negative nitrites, large blood, WBC 10, RBC 35, many bacteria, squamous epithelial cells present  - Appreciate ID recs- d/c CTX  - Urology recs appreciated  - CT A/P reviewed  - F/u Ucx NGTD and PSA elevated    GAMA  likely pre-renal  C/w maintenance IVF  Avoid nephrotoxins, renally dose meds  Encourage PO hydration  Monitor renal function    Chronic medical conditions:  HTN, uncontrolled: BP as elevated as 174/86 on admission, PTA hydralazine 100 mg BID, amlodipine 10 mg, lisinopril 40 mg held due to allowing for permissive HTN, recent dc summary states was recommended to start on HCTZ but patient is not on it?  Thrombocytopenia, chronic: plts 133K on admission, unknown baseline but plts 114K (on [facility] H&P on 6/15/25), no signs of active bleeding    DVT ppx- heparin ppx  PT eval- O/p PT

## 2025-06-23 NOTE — PROGRESS NOTE ADULT - SUBJECTIVE AND OBJECTIVE BOX
Neurology Progress Note    S: Patient seen and examined    MEDICATIONS:    acetaminophen     Tablet .. 650 milliGRAM(s) Oral every 6 hours PRN  aluminum hydroxide/magnesium hydroxide/simethicone Suspension 30 milliLiter(s) Oral every 4 hours PRN  amLODIPine   Tablet 10 milliGRAM(s) Oral daily  aspirin  chewable 81 milliGRAM(s) Oral daily  atorvastatin 40 milliGRAM(s) Oral at bedtime  carvedilol 6.25 milliGRAM(s) Oral every 12 hours  finasteride 5 milliGRAM(s) Oral daily  heparin   Injectable 5000 Unit(s) SubCutaneous every 8 hours  hydrALAZINE 100 milliGRAM(s) Oral three times a day  lisinopril 40 milliGRAM(s) Oral daily  melatonin 3 milliGRAM(s) Oral at bedtime PRN  nystatin Powder 1 Application(s) Topical two times a day  ondansetron Injectable 4 milliGRAM(s) IV Push every 8 hours PRN  senna 2 Tablet(s) Oral at bedtime  tamsulosin 0.8 milliGRAM(s) Oral at bedtime      LABS:                          13.0   5.76  )-----------( 125      ( 22 Jun 2025 14:47 )             36.7     06-22    141  |  106  |  18  ----------------------------<  98  4.1   |  30  |  1.30    Ca    9.1      22 Jun 2025 14:47      CAPILLARY BLOOD GLUCOSE          Urinalysis Basic - ( 22 Jun 2025 14:47 )    Color: x / Appearance: x / SG: x / pH: x  Gluc: 98 mg/dL / Ketone: x  / Bili: x / Urobili: x   Blood: x / Protein: x / Nitrite: x   Leuk Esterase: x / RBC: x / WBC x   Sq Epi: x / Non Sq Epi: x / Bacteria: x      I&O's Summary    22 Jun 2025 07:01  -  23 Jun 2025 07:00  --------------------------------------------------------  IN: 640 mL / OUT: 1850 mL / NET: -1210 mL    23 Jun 2025 07:01  -  23 Jun 2025 20:51  --------------------------------------------------------  IN: 600 mL / OUT: 800 mL / NET: -200 mL      Vital Signs Last 24 Hrs  T(C): 37 (23 Jun 2025 15:50), Max: 37 (23 Jun 2025 15:50)  T(F): 98.6 (23 Jun 2025 15:50), Max: 98.6 (23 Jun 2025 15:50)  HR: 86 (23 Jun 2025 15:50) (78 - 95)  BP: 158/81 (23 Jun 2025 15:50) (137/71 - 173/89)  BP(mean): 117 (23 Jun 2025 04:19) (110 - 117)  RR: 17 (23 Jun 2025 15:50) (17 - 18)  SpO2: 97% (23 Jun 2025 15:50) (93% - 97%)    Parameters below as of 23 Jun 2025 08:40  Patient On (Oxygen Delivery Method): room air            General Exam:   General Appearance: Appropriately dressed and in no acute distress       Head: Normocephalic, atraumatic and no dysmorphic features  Ear, Nose, and Throat: Moist mucous membranes  CVS: S1S2+  Resp: No SOB, no wheeze or rhonchi  Abd: soft NTND  Extremities: No edema, no cyanosis  Skin: No bruises, no rashes     Neurological Exam:  Mental Status: Awake, alert and oriented x 2.  Able to follow simple commands. Speech fluent, no dysarthria   Cranial Nerves: PERRL, EOMI, VFFC, sensation V1-V3 intact,  no obvious facial asymmetry, equal elevation of palate, scm/trap 5/5, tongue is midline on protrusion. hearing is grossly intact.   Motor: Normal bulk, tone and strength throughout. Fine finger movements were intact and symmetric. no tremors or drift noted.    Sensation: Intact to light touch   Reflexes: 1+ throughout at biceps, brachioradialis, triceps, patellars and ankles bilaterally and equal. No clonus. R toe and L toe were both downgoing.  Coordination: No dysmetria on FNF  Gait: deferred      I personally reviewed the below data/images/labs:      < from: CT Brain Stroke Protocol (06.17.25 @ 16:37) >    ACC: [id number] EXAM:  CT ANGIO BRAIN STROKE PROTC IC   ORDERED BY:   SHAWN LUU     ACC: [id number] EXAM:  CT ANGIO NECK STROKE PROTCL IC   ORDERED BY:   SHAWN LUU     ACC: [id number] EXAM:  CT BRAIN STROKE PROTOCOL   ORDERED BY: SHAWN LUU     ACC: [id number] EXAM:  CT BRAIN PERFUSION MAPS STROKE   ORDERED BY:   SHAWN LUU     PROCEDURE DATE:  06/17/2025          INTERPRETATION:  CLINICAL STATEMENT: Stroke protocol.    TECHNIQUE: Stroke protocol.  CTA brain and neck. CT perfusion: After the   administration of 50 cc of Omnipaque 300 serial thin sections were   obtained through the brain the purposes of evaluating CT perfusion. Raw   data was sent to the ischemia rapid view software for postprocessing. 90   cc Omnipaque 350 Intravenous contrast was administered for the CTA   compartment and 10 cc discarded. 2-D MIP and 3-D volume rendering images.    COMPARISON: None.    FINDINGS:  CT Head:  There is moderate diffuse parenchymal volume loss.    There are areas of low attenuation in the periventricular white matter   likely related to mild chronic microvascular ischemic changes.    There is no acute intracranial hemorrhage, parenchymal mass, mass effect   or midline shift. There is no acute territorial infarct. There is no   hydrocephalus.    The cranium is intact. The visualized paranasal sinuses are well-aerated.      CTA of the neck:      The common carotid and internal carotid arteries are patent.    The extracranial vertebral arteries are patent. Dominant left vertebral   artery noted    Degenerative changes noted    CTA Head:  Calcified atherosclerotic plaques noted in the cavernous and supraclinoid   internal carotid arteries    The proximal anterior, middle and posterior cerebral arteries are patent.    The intracranial vertebral and basilar arteries are patent.    There is no intracranial aneurysm.        CT perfusion:  No core infarct.    Delayed mean transit time noted in the medial left frontal lobe.    CBF<30% volume: 0 ml  Tmax>6.0 s volume: 5 ml  Mismatch volume: 5 ml  Mismatch ratio: Infinite          IMPRESSION:  No acute intracranial hemorrhage or acute territorial infarct. If acute   ischemia is a strong clinical concern, MRI exam is recommended for   further evaluation if there is no contraindication. Dr. Luu notified   6/17/2025 at 4:54 PM    CT perfusion demonstrates delayed mean transit time in the medial left   frontal lobe with mismatched volume of 5 mL. Ischemic penumbra not   definitely excluded. MRI exam recommended.    No hemodynamically significant stenosis    --- End of Report ---            KENDELL COPE MD  This document has been electronically signed. Jun 17 2025  5:15PM    < end of copied text >    < from: MR Head No Cont (06.18.25 @ 12:11) >    ACC: [id number] EXAM:  MR BRAIN   ORDERED BY: CHAPITO MARINO     PROCEDURE DATE:  06/18/2025          INTERPRETATION:  CLINICAL STATEMENT: Pain.    TECHNIQUE: MRI of the brain without gadolinium.    COMPARISON: CT head 6/17/2025    FINDINGS:  There is moderate diffuse parenchymal volume loss.    There are nonspecific T2 prolongation signal abnormalities in the clarita,   periventricular subcortical white matter likely related to moderate   chronic microvascular ischemic changes.    There is no acute parenchymal hemorrhage, parenchymal mass, mass effect   or midline shift. There is no extra-axial fluid collection.  There is no   hydrocephalus.  There is no acute infarct.    Retention cyst/polyp left maxillary sinus. Partial opacification left   mastoid air cells.    IMPRESSION:  No acute intracranial hemorrhage or acute infarct.    --- End of Report ---    < end of copied text >    e  EEG Classification / Summary:  Normal EEG study in the awake / drowsy states  -ECG: sinus tachycardia    -----------------------------------------------------------------------------------------------------    Clinical Impression:  Normal EEG study  There were no epileptiform abnormalities or seizures recorded.    Sinus tachycardia was present on ECG recording.

## 2025-06-23 NOTE — PROGRESS NOTE ADULT - REASON FOR ADMISSION
Syncope vs. TIA vs. CVA

## 2025-06-23 NOTE — DISCHARGE NOTE NURSING/CASE MANAGEMENT/SOCIAL WORK - PATIENT PORTAL LINK FT
You can access the FollowMyHealth Patient Portal offered by [facility] by registering at the following website: [email or web address]. By joining [employer]’s FollowMyHealth portal, you will also be able to view your health information using other applications (apps) compatible with our system.

## 2025-06-23 NOTE — DISCHARGE NOTE NURSING/CASE MANAGEMENT/SOCIAL WORK - FINANCIAL ASSISTANCE
[facility] provides services at a reduced cost to those who are determined to be eligible through [facility]’s financial assistance program. Information regarding [facility]’s financial assistance program can be found by going to [email or web address] or by calling [phone].

## 2025-07-02 ENCOUNTER — INPATIENT (INPATIENT)
Facility: HOSPITAL | Age: 85
LOS: 3 days | Discharge: HOME | End: 2025-07-06
Attending: STUDENT IN AN ORGANIZED HEALTH CARE EDUCATION/TRAINING PROGRAM | Admitting: STUDENT IN AN ORGANIZED HEALTH CARE EDUCATION/TRAINING PROGRAM
Payer: SELF-PAY

## 2025-07-02 VITALS
TEMPERATURE: 99 F | SYSTOLIC BLOOD PRESSURE: 107 MMHG | HEART RATE: 77 BPM | RESPIRATION RATE: 19 BRPM | OXYGEN SATURATION: 94 % | DIASTOLIC BLOOD PRESSURE: 53 MMHG | HEIGHT: 74 IN | WEIGHT: 177.91 LBS

## 2025-07-02 DIAGNOSIS — R79.89 OTHER SPECIFIED ABNORMAL FINDINGS OF BLOOD CHEMISTRY: ICD-10-CM

## 2025-07-02 DIAGNOSIS — N40.0 BENIGN PROSTATIC HYPERPLASIA WITHOUT LOWER URINARY TRACT SYMPTOMS: ICD-10-CM

## 2025-07-02 DIAGNOSIS — N17.9 ACUTE KIDNEY FAILURE, UNSPECIFIED: ICD-10-CM

## 2025-07-02 DIAGNOSIS — I10 ESSENTIAL (PRIMARY) HYPERTENSION: ICD-10-CM

## 2025-07-02 DIAGNOSIS — N30.00 ACUTE CYSTITIS WITHOUT HEMATURIA: ICD-10-CM

## 2025-07-02 PROBLEM — Z97.8 PRESENCE OF OTHER SPECIFIED DEVICES: Chronic | Status: ACTIVE | Noted: 2025-06-17

## 2025-07-02 LAB
ALBUMIN SERPL ELPH-MCNC: 2.8 G/DL — LOW (ref 3.3–5)
ALP SERPL-CCNC: 61 U/L — SIGNIFICANT CHANGE UP (ref 40–120)
ALT FLD-CCNC: 25 U/L — SIGNIFICANT CHANGE UP (ref 12–78)
ANION GAP SERPL CALC-SCNC: 8 MMOL/L — SIGNIFICANT CHANGE UP (ref 5–17)
APPEARANCE UR: ABNORMAL
APTT BLD: 30.9 SEC — SIGNIFICANT CHANGE UP (ref 26.1–36.8)
AST SERPL-CCNC: 21 U/L — SIGNIFICANT CHANGE UP (ref 15–37)
BACTERIA # UR AUTO: ABNORMAL /HPF
BASOPHILS # BLD AUTO: 0 K/UL — SIGNIFICANT CHANGE UP (ref 0–0.2)
BASOPHILS NFR BLD AUTO: 0 % — SIGNIFICANT CHANGE UP (ref 0–2)
BILIRUB SERPL-MCNC: 1.1 MG/DL — SIGNIFICANT CHANGE UP (ref 0.2–1.2)
BILIRUB UR-MCNC: NEGATIVE — SIGNIFICANT CHANGE UP
BUN SERPL-MCNC: 34 MG/DL — HIGH (ref 7–23)
CALCIUM SERPL-MCNC: 8.6 MG/DL — SIGNIFICANT CHANGE UP (ref 8.5–10.1)
CHLORIDE SERPL-SCNC: 105 MMOL/L — SIGNIFICANT CHANGE UP (ref 96–108)
CK MB BLD-MCNC: <1 % — SIGNIFICANT CHANGE UP (ref 0–3.5)
CK MB CFR SERPL CALC: <1 NG/ML — SIGNIFICANT CHANGE UP (ref 0.5–3.6)
CK SERPL-CCNC: 97 U/L — SIGNIFICANT CHANGE UP (ref 26–308)
CO2 SERPL-SCNC: 25 MMOL/L — SIGNIFICANT CHANGE UP (ref 22–31)
COLOR SPEC: ABNORMAL
CREAT SERPL-MCNC: 2.49 MG/DL — HIGH (ref 0.5–1.3)
DIFF PNL FLD: ABNORMAL
EGFR: 24 ML/MIN/1.73M2 — LOW
EGFR: 24 ML/MIN/1.73M2 — LOW
EOSINOPHIL # BLD AUTO: 0 K/UL — SIGNIFICANT CHANGE UP (ref 0–0.5)
EOSINOPHIL NFR BLD AUTO: 0 % — SIGNIFICANT CHANGE UP (ref 0–6)
EPI CELLS # UR: PRESENT
FLUAV AG NPH QL: SIGNIFICANT CHANGE UP
FLUBV AG NPH QL: SIGNIFICANT CHANGE UP
GLUCOSE SERPL-MCNC: 100 MG/DL — HIGH (ref 70–99)
GLUCOSE UR QL: NEGATIVE MG/DL — SIGNIFICANT CHANGE UP
HCT VFR BLD CALC: 36.5 % — LOW (ref 39–50)
HGB BLD-MCNC: 13.1 G/DL — SIGNIFICANT CHANGE UP (ref 13–17)
INR BLD: 1.56 RATIO — HIGH (ref 0.85–1.16)
KETONES UR QL: NEGATIVE MG/DL — SIGNIFICANT CHANGE UP
LACTATE SERPL-SCNC: 2.6 MMOL/L — HIGH (ref 0.7–2)
LACTATE SERPL-SCNC: 3 MMOL/L — HIGH (ref 0.7–2)
LACTATE SERPL-SCNC: 3.3 MMOL/L — HIGH (ref 0.7–2)
LEUKOCYTE ESTERASE UR-ACNC: ABNORMAL
LYMPHOCYTES # BLD AUTO: 0.93 K/UL — LOW (ref 1–3.3)
LYMPHOCYTES # BLD AUTO: 5 % — LOW (ref 13–44)
MANUAL SMEAR VERIFICATION: SIGNIFICANT CHANGE UP
MCHC RBC-ENTMCNC: 27.2 PG — SIGNIFICANT CHANGE UP (ref 27–34)
MCHC RBC-ENTMCNC: 35.9 G/DL — SIGNIFICANT CHANGE UP (ref 32–36)
MCV RBC AUTO: 75.7 FL — LOW (ref 80–100)
MONOCYTES # BLD AUTO: 1.12 K/UL — HIGH (ref 0–0.9)
MONOCYTES NFR BLD AUTO: 6 % — SIGNIFICANT CHANGE UP (ref 2–14)
MYELOCYTES NFR BLD: 1 % — HIGH (ref 0–0)
NEUTROPHILS # BLD AUTO: 16.39 K/UL — HIGH (ref 1.8–7.4)
NEUTROPHILS NFR BLD AUTO: 85 % — HIGH (ref 43–77)
NEUTS BAND # BLD: 3 % — SIGNIFICANT CHANGE UP (ref 0–8)
NEUTS BAND NFR BLD: 3 % — SIGNIFICANT CHANGE UP (ref 0–8)
NITRITE UR-MCNC: NEGATIVE — SIGNIFICANT CHANGE UP
NRBC # BLD: 0 /100 WBCS — SIGNIFICANT CHANGE UP (ref 0–0)
NRBC BLD AUTO-RTO: SIGNIFICANT CHANGE UP /100 WBCS (ref 0–0)
NRBC BLD-RTO: 0 /100 WBCS — SIGNIFICANT CHANGE UP (ref 0–0)
NT-PROBNP SERPL-SCNC: 2152 PG/ML — HIGH (ref 0–450)
PH UR: 5.5 — SIGNIFICANT CHANGE UP (ref 5–8)
PLAT MORPH BLD: NORMAL — SIGNIFICANT CHANGE UP
PLATELET # BLD AUTO: 107 K/UL — LOW (ref 150–400)
POTASSIUM SERPL-MCNC: 3.9 MMOL/L — SIGNIFICANT CHANGE UP (ref 3.5–5.3)
POTASSIUM SERPL-SCNC: 3.9 MMOL/L — SIGNIFICANT CHANGE UP (ref 3.5–5.3)
PROT SERPL-MCNC: 6.6 GM/DL — SIGNIFICANT CHANGE UP (ref 6–8.3)
PROT UR-MCNC: 30 MG/DL
PROTHROM AB SERPL-ACNC: 17.5 SEC — HIGH (ref 9.9–13.4)
RBC # BLD: 4.82 M/UL — SIGNIFICANT CHANGE UP (ref 4.2–5.8)
RBC # FLD: 14.9 % — HIGH (ref 10.3–14.5)
RBC BLD AUTO: NORMAL — SIGNIFICANT CHANGE UP
RBC CASTS # UR COMP ASSIST: ABNORMAL /HPF
RSV RNA NPH QL NAA+NON-PROBE: SIGNIFICANT CHANGE UP
SARS-COV-2 RNA SPEC QL NAA+PROBE: SIGNIFICANT CHANGE UP
SODIUM SERPL-SCNC: 138 MMOL/L — SIGNIFICANT CHANGE UP (ref 135–145)
SOURCE RESPIRATORY: SIGNIFICANT CHANGE UP
SP GR SPEC: 1.01 — SIGNIFICANT CHANGE UP (ref 1–1.03)
TROPONIN I, HIGH SENSITIVITY RESULT: 255.8 NG/L — HIGH
TROPONIN I, HIGH SENSITIVITY RESULT: 423.2 NG/L — HIGH
UROBILINOGEN FLD QL: 0.2 MG/DL — SIGNIFICANT CHANGE UP (ref 0.2–1)
WBC # BLD: 18.62 K/UL — HIGH (ref 3.8–10.5)
WBC # FLD AUTO: 18.62 K/UL — HIGH (ref 3.8–10.5)
WBC UR QL: ABNORMAL /HPF (ref 0–5)

## 2025-07-02 PROCEDURE — 99291 CRITICAL CARE FIRST HOUR: CPT

## 2025-07-02 PROCEDURE — 71045 X-RAY EXAM CHEST 1 VIEW: CPT | Mod: 26

## 2025-07-02 PROCEDURE — 93010 ELECTROCARDIOGRAM REPORT: CPT

## 2025-07-02 PROCEDURE — 99232 SBSQ HOSP IP/OBS MODERATE 35: CPT

## 2025-07-02 PROCEDURE — 99053 MED SERV 10PM-8AM 24 HR FAC: CPT

## 2025-07-02 PROCEDURE — 99223 1ST HOSP IP/OBS HIGH 75: CPT

## 2025-07-02 RX ORDER — CEFTRIAXONE 500 MG/1
1000 INJECTION, POWDER, FOR SOLUTION INTRAMUSCULAR; INTRAVENOUS ONCE
Refills: 0 | Status: COMPLETED | OUTPATIENT
Start: 2025-07-02 | End: 2025-07-02

## 2025-07-02 RX ORDER — ASPIRIN 325 MG
81 TABLET ORAL DAILY
Refills: 0 | Status: DISCONTINUED | OUTPATIENT
Start: 2025-07-02 | End: 2025-07-06

## 2025-07-02 RX ORDER — ATORVASTATIN CALCIUM 80 MG/1
40 TABLET, FILM COATED ORAL AT BEDTIME
Refills: 0 | Status: DISCONTINUED | OUTPATIENT
Start: 2025-07-02 | End: 2025-07-06

## 2025-07-02 RX ORDER — SODIUM CHLORIDE 9 G/1000ML
1000 INJECTION, SOLUTION INTRAVENOUS
Refills: 0 | Status: DISCONTINUED | OUTPATIENT
Start: 2025-07-02 | End: 2025-07-03

## 2025-07-02 RX ORDER — MELATONIN 5 MG
3 TABLET ORAL AT BEDTIME
Refills: 0 | Status: DISCONTINUED | OUTPATIENT
Start: 2025-07-02 | End: 2025-07-06

## 2025-07-02 RX ORDER — CEFTRIAXONE 500 MG/1
1000 INJECTION, POWDER, FOR SOLUTION INTRAMUSCULAR; INTRAVENOUS EVERY 24 HOURS
Refills: 0 | Status: COMPLETED | OUTPATIENT
Start: 2025-07-03 | End: 2025-07-05

## 2025-07-02 RX ORDER — ACETAMINOPHEN 500 MG/5ML
650 LIQUID (ML) ORAL EVERY 6 HOURS
Refills: 0 | Status: DISCONTINUED | OUTPATIENT
Start: 2025-07-02 | End: 2025-07-06

## 2025-07-02 RX ORDER — ONDANSETRON HCL/PF 4 MG/2 ML
4 VIAL (ML) INJECTION EVERY 8 HOURS
Refills: 0 | Status: DISCONTINUED | OUTPATIENT
Start: 2025-07-02 | End: 2025-07-06

## 2025-07-02 RX ORDER — SODIUM CHLORIDE 9 G/1000ML
1000 INJECTION, SOLUTION INTRAVENOUS
Refills: 0 | Status: COMPLETED | OUTPATIENT
Start: 2025-07-02 | End: 2025-07-02

## 2025-07-02 RX ORDER — CEFTRIAXONE 500 MG/1
1000 INJECTION, POWDER, FOR SOLUTION INTRAMUSCULAR; INTRAVENOUS ONCE
Refills: 0 | Status: DISCONTINUED | OUTPATIENT
Start: 2025-07-02 | End: 2025-07-02

## 2025-07-02 RX ORDER — FINASTERIDE 1 MG/1
5 TABLET, FILM COATED ORAL DAILY
Refills: 0 | Status: DISCONTINUED | OUTPATIENT
Start: 2025-07-02 | End: 2025-07-06

## 2025-07-02 RX ORDER — TAMSULOSIN HYDROCHLORIDE 0.4 MG/1
0.8 CAPSULE ORAL AT BEDTIME
Refills: 0 | Status: DISCONTINUED | OUTPATIENT
Start: 2025-07-02 | End: 2025-07-06

## 2025-07-02 RX ADMIN — ATORVASTATIN CALCIUM 40 MILLIGRAM(S): 80 TABLET, FILM COATED ORAL at 22:33

## 2025-07-02 RX ADMIN — SODIUM CHLORIDE 125 MILLILITER(S): 9 INJECTION, SOLUTION INTRAVENOUS at 22:34

## 2025-07-02 RX ADMIN — TAMSULOSIN HYDROCHLORIDE 0.8 MILLIGRAM(S): 0.4 CAPSULE ORAL at 22:33

## 2025-07-02 RX ADMIN — CEFTRIAXONE 1000 MILLIGRAM(S): 500 INJECTION, POWDER, FOR SOLUTION INTRAMUSCULAR; INTRAVENOUS at 13:25

## 2025-07-02 RX ADMIN — FINASTERIDE 5 MILLIGRAM(S): 1 TABLET, FILM COATED ORAL at 17:46

## 2025-07-02 RX ADMIN — Medication 81 MILLIGRAM(S): at 17:44

## 2025-07-02 RX ADMIN — SODIUM CHLORIDE 125 MILLILITER(S): 9 INJECTION, SOLUTION INTRAVENOUS at 16:00

## 2025-07-02 RX ADMIN — SODIUM CHLORIDE 1000 MILLILITER(S): 9 INJECTION, SOLUTION INTRAVENOUS at 14:41

## 2025-07-02 RX ADMIN — Medication 2500 MILLILITER(S): at 11:12

## 2025-07-02 NOTE — ED PROVIDER NOTE - WET READ LAUNCH FT
Population Health Chart Review & Patient Outreach Details    Campaign Outreach    Updates Requested / Reviewed:  [x]  Care Team Updated    Health Maintenance Topics Addressed and Outreach Outcomes / Actions Taken:        Cervical Cancer Screening [x] Scheduled with Dr. Muhammad 02/01/24; ALIE sent to Dr. Bull for November 2022 Records.                            There are no Wet Read(s) to document.

## 2025-07-02 NOTE — ED ADULT NURSE NOTE - OBJECTIVE STATEMENT
Patient A+OX4 presents c/o abdominal pain. Creole  [id number] dilcia, presents with daughter. Patient states he was constipated, daughter gave him doucolax, has been having diarrhea since. patient's daughter states his BP was 100/52, was concerned and called EMS. patient is complaining of 10/10 abdominal pain, denies any fevers, chills, n/v, dizziness. EMS LAC 18G and 1L NS.

## 2025-07-02 NOTE — ED PROVIDER NOTE - LANGUAGE ASSISTANCE NEEDED
----- Message from Khalida Beatty, Patient Care Assistant sent at 9/8/2022 10:39 AM CDT -----  Regarding: appointment  Contact: pt  Type: Needs Medical Advice  Who Called:  pt   Best Call Back Number:      Additional Information: pt states she would like a callback regarding cancelling her nurse visit on 9/9/22. Thanks!         No-Patient/Caregiver offered and refused free interpretation services.

## 2025-07-02 NOTE — ED ADULT NURSE NOTE - NSFALLHARMRISKINTERV_ED_ALL_ED

## 2025-07-02 NOTE — ED ADULT NURSE NOTE - NSFALLCONCLUSION_ED_ALL_ED
Quality 110: Preventive Care And Screening: Influenza Immunization: Influenza Immunization Administered during Influenza season
Quality 111:Pneumonia Vaccination Status For Older Adults: Pneumococcal Vaccination Previously Received
Detail Level: Detailed
Fall with Harm Risk

## 2025-07-02 NOTE — H&P ADULT - NSHPPHYSICALEXAM_GEN_ALL_CORE
CONSTITUTIONAL: alert and cooperative, no acute distress.   EYES: PERRL, no scleral icterus  ENT: Mucosa moist, tongue normal.  NECK: Neck supple, trachea midline, non-tender  CARDIAC: Normal S1 and S2. Regular rate and rhythms. No Pedal edema  LUNGS: Equal air entry both lungs. No rales, rhonchi, wheezing. Normal respiratory effort.   ABDOMEN: Soft, nondistended, nontender. No guarding or rebound tenderness. No hepatomegaly or splenomegaly. Bowel sound normal.   MUSCULOSKELETAL: Normocephalic, atraumatic. No significant deformity or joint abnormality  NEUROLOGICAL: No gross motor or sensory deficits.   SKIN: no lesions or eruptions. Normal turgor  PSYCHIATRIC: A&O x 3, appropriate mood and affect

## 2025-07-02 NOTE — ED PROVIDER NOTE - OBJECTIVE STATEMENT
85-year-old male from home presents with malaise since yesterday patient takes hydrochlorothiazide 25 mg and carvedilol 6.25 mg.  Yesterday the patient was complaining of constipation and the daughter gave him Dulcolax.  Today patient had large bowel movement.  However at home today he was feeling weak and was also complaining of burning with urination.  Patient a recent admission during which he ended up having a Lind catheter and a UTI at [facility].  The Lind catheter was removed and patient's been urinating without difficulty at home.  Patient was feeling well until yesterday.  No vomiting no fever. patient denies chest pain

## 2025-07-02 NOTE — ED ADULT TRIAGE NOTE - CHIEF COMPLAINT QUOTE
Pt biba from home with c/o lethargy, and hypotension (97/50). Ems reports pt family double dose medication  ( Atorvastatin 40mg x2, Tamsulosin 0.4mg x4), ems states wife gave a dose, not knowing daughter already did.   pt c/o and pain , and headache. EMS gave 1700cc ns, Tylenol 1000mg, 18g l AC  h/o: cva, bph, htn, hld,

## 2025-07-02 NOTE — H&P ADULT - HISTORY OF PRESENT ILLNESS
85 years old male with h/o HTN, HLD, BPH present to ED with generalized weakness. Patient with h/o BPH with urinary retention required Lind. Lind was removed on Monday. Patient reported dysuria which started yesterday, associated with generalized weakness. No nausea, vomiting or diarrhea. No cough or SOB. No chest pain, palpitation or diaphoresis.  Hemodynamically stable, afebrile, sat well at RA. EKG with NSR, TWI V5,6. WBC 18.62, plt 107, lactate 3.3, Cr 2.49 ( 1.3 in 6/22/25), hsTnT 423. UA dirty. Flu/COVID/RSV negative. CXR with no focal consolidation

## 2025-07-02 NOTE — H&P ADULT - PROBLEM SELECTOR PLAN 1
Cr 2.49 ( 1.3 in 6/22/25)  Clinically appear dry  Continue IV fluid, monitor renal function, avoid nephrotoxic substances  Hold lisinopril  Check CT abd/pelvis

## 2025-07-02 NOTE — CONSULT NOTE ADULT - ASSESSMENT
Elevated troponin with negative CK and CK MB in setting of acute illness  Not consistent with ACS.     HTN  Restart Carvedilol at 3.125 mg BID    HLD  Continue statin therapy    Thank you for the courtesy of this consult.   Reconsult as needed.

## 2025-07-02 NOTE — ED PROVIDER NOTE - CLINICAL SUMMARY MEDICAL DECISION MAKING FREE TEXT BOX
patient with malaise since yesterday.  Patient's blood pressure in the ED is a little bit low.  Labs reveal leukocytosis lactic acidosis and a UTI.  Code sepsis called.  Admit for IV antibiotics.  Lind catheter placed

## 2025-07-02 NOTE — H&P ADULT - PROBLEM SELECTOR PLAN 3
hsTnT 423  No chest pain  EKG  ( I personally review and interpreted the images) NSR, slight lateral TWI appear new  On aspirin, statin  serial trop/Ck/CKMB, telemetry monitoring  Cardiology consulted, discussed with Dr Lacy

## 2025-07-02 NOTE — CONSULT NOTE ADULT - SUBJECTIVE AND OBJECTIVE BOX
Date of Admission: 2026    CHIEF COMPLAINT: Dysuria    HISTORY OF PRESENT ILLNESS:  85 years old male with h/o HTN, HLD, BPH present to ED with generalized weakness, dysuria after recent Lind catheter removal, found to have elevated troponin level.   Patient denies any chest pain, shortness of breath, palpitations, or lower extremity edema.        Allergies    No Known Allergies    Intolerances    	    MEDICATIONS:  aspirin enteric coated 81 milliGRAM(s) Oral daily        acetaminophen     Tablet .. 650 milliGRAM(s) Oral every 6 hours PRN  melatonin 3 milliGRAM(s) Oral at bedtime PRN  ondansetron Injectable 4 milliGRAM(s) IV Push every 8 hours PRN      atorvastatin 40 milliGRAM(s) Oral at bedtime  finasteride 5 milliGRAM(s) Oral daily    lactated ringers. 1000 milliLiter(s) IV Continuous <Continuous>  tamsulosin 0.8 milliGRAM(s) Oral at bedtime      PAST MEDICAL & SURGICAL HISTORY:  HTN (hypertension)      Indwelling Lind catheter present          FAMILY HISTORY:  non contributory      SOCIAL HISTORY:    x[ ] Non-smoker  [ ]      REVIEW OF SYSTEMS:  General: no fatigue/malaise, weight loss/gain.  Skin: no rashes.  Ophthalmologic: no blurred vision, no loss of vision. 	  ENT: no sore throat, rhinorrhea, sinus congestion.  Respiratory: no SOB, cough or wheeze.  Gastrointestinal:  no N/V/D, no melena/hematemesis/hematochezia.  Genitourinary: as above  Musculoskeletal: no myalgias or arthralgias.  Neurological: no changes in vision or hearing, no lightheadedness/dizziness, no syncope/near syncope	  Psychiatric: no unusual stress/anxiety.   Hematology/Lymphatics: no unusual bleeding, bruising and no lymphadenopathy.  Endocrine: no unusual thirst.   All others negative except as stated above and in HPI.    PHYSICAL EXAM:  T(C): 36.8 (25 @ 16:55), Max: 37.1 (25 @ 07:50)  HR: 70 (25 @ 16:55) (70 - 81)  BP: 156/69 (25 @ 16:55) (105/69 - 156/69)  RR: 18 (25 @ 16:55) (16 - 19)  SpO2: 98% (25 @ 16:55) (94% - 98%)  Wt(kg): --  I&O's Summary      Appearance: Elderly Black male, in bed, comfortable  HEENT:   Normal oral mucosa, PERRL, EOMI	  Lymphatic: No lymphadenopathy  Cardiovascular: Normal S1 S2, No JVD, No murmurs, No edema  Respiratory: Lungs clear to auscultation	  Psychiatry: A & O x 3, Mood & affect appropriate  Gastrointestinal:  Soft, Non-tender, + BS	  Skin: No rashes, No ecchymoses, No cyanosis	  Neurologic: Non-focal  Extremities: Normal range of motion, No clubbing, cyanosis or edema  Vascular: Peripheral pulses palpable 2+ bilaterally        LABS:	 	    CBC Full  -  ( 2025 11:04 )  WBC Count : 18.62 K/uL  Hemoglobin : 13.1 g/dL  Hematocrit : 36.5 %  Platelet Count - Automated : 107 K/uL  Mean Cell Volume : 75.7 fl  Mean Cell Hemoglobin : 27.2 pg  Mean Cell Hemoglobin Concentration : 35.9 g/dL  Auto Neutrophil # : x  Auto Lymphocyte # : x  Auto Monocyte # : x  Auto Eosinophil # : x  Auto Basophil # : x  Auto Neutrophil % : x  Auto Lymphocyte % : x  Auto Monocyte % : x  Auto Eosinophil % : x  Auto Basophil % : x        138  |  105  |  34[H]  ----------------------------<  100[H]  3.9   |  25  |  2.49[H]    Ca    8.6      2025 11:04    TPro  6.6  /  Alb  2.8[L]  /  TBili  1.1  /  DBili  x   /  AST  21  /  ALT  25  /  AlkPhos  61  07-      proBNP: 2152        CARDIAC MARKERS:  Trop 423->255  CK and CK mB negative            TELEMETRY: SR	    EC2025 SR, first degree AV block, NSSTTWA       	  ASSESSMENT/PLAN:

## 2025-07-02 NOTE — H&P ADULT - PROBLEM SELECTOR PLAN 2
dysuria. Recent Lind removal on Monday  Leukocytosis with WBC 18.62  UA dirty  CXR  ( I personally review and interpreted the images) no focal consolidation  Lind placed in ED. Collateral information obtained from daughter Parul, no more urinary retention issue since started on Flomax and finasteride. Lind in ED drain 100s cc only. Will remove Lind given UTI ( provider to RN comminucatino placed). Monitor for urinary retention  Continue ceftriaxone 1g daily  Follow up blood and urine culture result  lactate 3.3, received 2 L fluid. Will give additional 1L bolus and then continue with maintenance fluid. Repeat lactate in PM. Closely monitor hemodynamic status  Check CT abd/pelvis

## 2025-07-02 NOTE — ED PROVIDER NOTE - CARE PLAN
Principal Discharge DX:	Acute UTI  Secondary Diagnosis:	Sepsis  Secondary Diagnosis:	NSTEMI (non-ST elevation myocardial infarction)   1

## 2025-07-03 LAB
ALBUMIN SERPL ELPH-MCNC: 2.3 G/DL — LOW (ref 3.3–5)
ALP SERPL-CCNC: 56 U/L — SIGNIFICANT CHANGE UP (ref 40–120)
ALT FLD-CCNC: 21 U/L — SIGNIFICANT CHANGE UP (ref 12–78)
ANION GAP SERPL CALC-SCNC: 6 MMOL/L — SIGNIFICANT CHANGE UP (ref 5–17)
AST SERPL-CCNC: 17 U/L — SIGNIFICANT CHANGE UP (ref 15–37)
BILIRUB SERPL-MCNC: 0.7 MG/DL — SIGNIFICANT CHANGE UP (ref 0.2–1.2)
BUN SERPL-MCNC: 27 MG/DL — HIGH (ref 7–23)
CALCIUM SERPL-MCNC: 8.1 MG/DL — LOW (ref 8.5–10.1)
CHLORIDE SERPL-SCNC: 109 MMOL/L — HIGH (ref 96–108)
CK MB BLD-MCNC: <1.2 % — SIGNIFICANT CHANGE UP (ref 0–3.5)
CK MB CFR SERPL CALC: <1 NG/ML — SIGNIFICANT CHANGE UP (ref 0.5–3.6)
CK SERPL-CCNC: 80 U/L — SIGNIFICANT CHANGE UP (ref 26–308)
CO2 SERPL-SCNC: 24 MMOL/L — SIGNIFICANT CHANGE UP (ref 22–31)
CREAT SERPL-MCNC: 1.53 MG/DL — HIGH (ref 0.5–1.3)
EGFR: 44 ML/MIN/1.73M2 — LOW
EGFR: 44 ML/MIN/1.73M2 — LOW
GLUCOSE SERPL-MCNC: 96 MG/DL — SIGNIFICANT CHANGE UP (ref 70–99)
HCT VFR BLD CALC: 32.7 % — LOW (ref 39–50)
HGB BLD-MCNC: 11.6 G/DL — LOW (ref 13–17)
MAGNESIUM SERPL-MCNC: 1.8 MG/DL — SIGNIFICANT CHANGE UP (ref 1.6–2.6)
MCHC RBC-ENTMCNC: 27.1 PG — SIGNIFICANT CHANGE UP (ref 27–34)
MCHC RBC-ENTMCNC: 35.5 G/DL — SIGNIFICANT CHANGE UP (ref 32–36)
MCV RBC AUTO: 76.4 FL — LOW (ref 80–100)
NRBC BLD AUTO-RTO: 0 /100 WBCS — SIGNIFICANT CHANGE UP (ref 0–0)
PHOSPHATE SERPL-MCNC: 2.1 MG/DL — LOW (ref 2.5–4.5)
PLATELET # BLD AUTO: 86 K/UL — LOW (ref 150–400)
POTASSIUM SERPL-MCNC: 3.6 MMOL/L — SIGNIFICANT CHANGE UP (ref 3.5–5.3)
POTASSIUM SERPL-SCNC: 3.6 MMOL/L — SIGNIFICANT CHANGE UP (ref 3.5–5.3)
PROT SERPL-MCNC: 5.5 GM/DL — LOW (ref 6–8.3)
RBC # BLD: 4.28 M/UL — SIGNIFICANT CHANGE UP (ref 4.2–5.8)
RBC # FLD: 14.8 % — HIGH (ref 10.3–14.5)
SODIUM SERPL-SCNC: 139 MMOL/L — SIGNIFICANT CHANGE UP (ref 135–145)
TROPONIN I, HIGH SENSITIVITY RESULT: 112 NG/L — HIGH
WBC # BLD: 13.98 K/UL — HIGH (ref 3.8–10.5)
WBC # FLD AUTO: 13.98 K/UL — HIGH (ref 3.8–10.5)

## 2025-07-03 PROCEDURE — 74176 CT ABD & PELVIS W/O CONTRAST: CPT | Mod: 26

## 2025-07-03 RX ORDER — HEPARIN SODIUM 1000 [USP'U]/ML
5000 INJECTION INTRAVENOUS; SUBCUTANEOUS EVERY 12 HOURS
Refills: 0 | Status: DISCONTINUED | OUTPATIENT
Start: 2025-07-03 | End: 2025-07-06

## 2025-07-03 RX ORDER — CARVEDILOL 3.12 MG/1
3.12 TABLET, FILM COATED ORAL EVERY 12 HOURS
Refills: 0 | Status: DISCONTINUED | OUTPATIENT
Start: 2025-07-03 | End: 2025-07-03

## 2025-07-03 RX ORDER — POTASSIUM PHOSPHATE, MONOBASIC POTASSIUM PHOSPHATE, DIBASIC INJECTION, 236; 224 MG/ML; MG/ML
30 SOLUTION, CONCENTRATE INTRAVENOUS ONCE
Refills: 0 | Status: COMPLETED | OUTPATIENT
Start: 2025-07-03 | End: 2025-07-03

## 2025-07-03 RX ADMIN — Medication 81 MILLIGRAM(S): at 11:47

## 2025-07-03 RX ADMIN — POTASSIUM PHOSPHATE, MONOBASIC POTASSIUM PHOSPHATE, DIBASIC INJECTION, 83.33 MILLIMOLE(S): 236; 224 SOLUTION, CONCENTRATE INTRAVENOUS at 23:41

## 2025-07-03 RX ADMIN — SODIUM CHLORIDE 125 MILLILITER(S): 9 INJECTION, SOLUTION INTRAVENOUS at 09:15

## 2025-07-03 RX ADMIN — CEFTRIAXONE 100 MILLIGRAM(S): 500 INJECTION, POWDER, FOR SOLUTION INTRAMUSCULAR; INTRAVENOUS at 05:45

## 2025-07-03 RX ADMIN — TAMSULOSIN HYDROCHLORIDE 0.8 MILLIGRAM(S): 0.4 CAPSULE ORAL at 21:32

## 2025-07-03 RX ADMIN — Medication 75 MILLILITER(S): at 11:43

## 2025-07-03 RX ADMIN — HEPARIN SODIUM 5000 UNIT(S): 1000 INJECTION INTRAVENOUS; SUBCUTANEOUS at 17:27

## 2025-07-03 RX ADMIN — SODIUM CHLORIDE 125 MILLILITER(S): 9 INJECTION, SOLUTION INTRAVENOUS at 05:47

## 2025-07-03 RX ADMIN — Medication 75 MILLILITER(S): at 21:33

## 2025-07-03 RX ADMIN — ATORVASTATIN CALCIUM 40 MILLIGRAM(S): 80 TABLET, FILM COATED ORAL at 21:32

## 2025-07-03 RX ADMIN — FINASTERIDE 5 MILLIGRAM(S): 1 TABLET, FILM COATED ORAL at 11:47

## 2025-07-04 LAB
ANION GAP SERPL CALC-SCNC: 6 MMOL/L — SIGNIFICANT CHANGE UP (ref 5–17)
BUN SERPL-MCNC: 18 MG/DL — SIGNIFICANT CHANGE UP (ref 7–23)
CALCIUM SERPL-MCNC: 8.3 MG/DL — LOW (ref 8.5–10.1)
CHLORIDE SERPL-SCNC: 111 MMOL/L — HIGH (ref 96–108)
CO2 SERPL-SCNC: 24 MMOL/L — SIGNIFICANT CHANGE UP (ref 22–31)
CREAT SERPL-MCNC: 1.22 MG/DL — SIGNIFICANT CHANGE UP (ref 0.5–1.3)
EGFR: 57 ML/MIN/1.73M2 — LOW
EGFR: 57 ML/MIN/1.73M2 — LOW
GLUCOSE SERPL-MCNC: 85 MG/DL — SIGNIFICANT CHANGE UP (ref 70–99)
HCT VFR BLD CALC: 32.2 % — LOW (ref 39–50)
HGB BLD-MCNC: 11.4 G/DL — LOW (ref 13–17)
MAGNESIUM SERPL-MCNC: 1.9 MG/DL — SIGNIFICANT CHANGE UP (ref 1.6–2.6)
MCHC RBC-ENTMCNC: 26.6 PG — LOW (ref 27–34)
MCHC RBC-ENTMCNC: 35.4 G/DL — SIGNIFICANT CHANGE UP (ref 32–36)
MCV RBC AUTO: 75.1 FL — LOW (ref 80–100)
NRBC BLD AUTO-RTO: 0 /100 WBCS — SIGNIFICANT CHANGE UP (ref 0–0)
PHOSPHATE SERPL-MCNC: 2.5 MG/DL — SIGNIFICANT CHANGE UP (ref 2.5–4.5)
PLATELET # BLD AUTO: 92 K/UL — LOW (ref 150–400)
POTASSIUM SERPL-MCNC: 3.6 MMOL/L — SIGNIFICANT CHANGE UP (ref 3.5–5.3)
POTASSIUM SERPL-SCNC: 3.6 MMOL/L — SIGNIFICANT CHANGE UP (ref 3.5–5.3)
RBC # BLD: 4.29 M/UL — SIGNIFICANT CHANGE UP (ref 4.2–5.8)
RBC # FLD: 14.6 % — HIGH (ref 10.3–14.5)
SODIUM SERPL-SCNC: 141 MMOL/L — SIGNIFICANT CHANGE UP (ref 135–145)
WBC # BLD: 10.72 K/UL — HIGH (ref 3.8–10.5)
WBC # FLD AUTO: 10.72 K/UL — HIGH (ref 3.8–10.5)

## 2025-07-04 PROCEDURE — 99232 SBSQ HOSP IP/OBS MODERATE 35: CPT

## 2025-07-04 RX ORDER — AMLODIPINE BESYLATE 10 MG/1
10 TABLET ORAL DAILY
Refills: 0 | Status: DISCONTINUED | OUTPATIENT
Start: 2025-07-04 | End: 2025-07-06

## 2025-07-04 RX ORDER — CARVEDILOL 3.12 MG/1
3.12 TABLET, FILM COATED ORAL EVERY 12 HOURS
Refills: 0 | Status: DISCONTINUED | OUTPATIENT
Start: 2025-07-04 | End: 2025-07-06

## 2025-07-04 RX ADMIN — Medication 75 MILLILITER(S): at 07:24

## 2025-07-04 RX ADMIN — FINASTERIDE 5 MILLIGRAM(S): 1 TABLET, FILM COATED ORAL at 11:21

## 2025-07-04 RX ADMIN — Medication 650 MILLIGRAM(S): at 17:13

## 2025-07-04 RX ADMIN — Medication 81 MILLIGRAM(S): at 11:21

## 2025-07-04 RX ADMIN — HEPARIN SODIUM 5000 UNIT(S): 1000 INJECTION INTRAVENOUS; SUBCUTANEOUS at 17:14

## 2025-07-04 RX ADMIN — Medication 650 MILLIGRAM(S): at 18:40

## 2025-07-04 RX ADMIN — CEFTRIAXONE 100 MILLIGRAM(S): 500 INJECTION, POWDER, FOR SOLUTION INTRAMUSCULAR; INTRAVENOUS at 05:59

## 2025-07-04 RX ADMIN — TAMSULOSIN HYDROCHLORIDE 0.8 MILLIGRAM(S): 0.4 CAPSULE ORAL at 21:41

## 2025-07-04 RX ADMIN — HEPARIN SODIUM 5000 UNIT(S): 1000 INJECTION INTRAVENOUS; SUBCUTANEOUS at 06:00

## 2025-07-04 RX ADMIN — ATORVASTATIN CALCIUM 40 MILLIGRAM(S): 80 TABLET, FILM COATED ORAL at 21:41

## 2025-07-05 LAB
-  AMIKACIN: SIGNIFICANT CHANGE UP
-  AMOXICILLIN/CLAVULANIC ACID: SIGNIFICANT CHANGE UP
-  AMPICILLIN/SULBACTAM: SIGNIFICANT CHANGE UP
-  AMPICILLIN/SULBACTAM: SIGNIFICANT CHANGE UP
-  AMPICILLIN: SIGNIFICANT CHANGE UP
-  AZTREONAM: SIGNIFICANT CHANGE UP
-  CEFAZOLIN: SIGNIFICANT CHANGE UP
-  CEFEPIME: SIGNIFICANT CHANGE UP
-  CEFEPIME: SIGNIFICANT CHANGE UP
-  CEFOXITIN: SIGNIFICANT CHANGE UP
-  CEFTAZIDIME: SIGNIFICANT CHANGE UP
-  CEFTRIAXONE: SIGNIFICANT CHANGE UP
-  CEFTRIAXONE: SIGNIFICANT CHANGE UP
-  CEFUROXIME: SIGNIFICANT CHANGE UP
-  CIPROFLOXACIN: SIGNIFICANT CHANGE UP
-  CIPROFLOXACIN: SIGNIFICANT CHANGE UP
-  ERTAPENEM: SIGNIFICANT CHANGE UP
-  GENTAMICIN: SIGNIFICANT CHANGE UP
-  GENTAMICIN: SIGNIFICANT CHANGE UP
-  IMIPENEM: SIGNIFICANT CHANGE UP
-  IMIPENEM: SIGNIFICANT CHANGE UP
-  LEVOFLOXACIN: SIGNIFICANT CHANGE UP
-  LEVOFLOXACIN: SIGNIFICANT CHANGE UP
-  MEROPENEM: SIGNIFICANT CHANGE UP
-  MEROPENEM: SIGNIFICANT CHANGE UP
-  NITROFURANTOIN: SIGNIFICANT CHANGE UP
-  PIPERACILLIN/TAZOBACTAM: SIGNIFICANT CHANGE UP
-  PIPERACILLIN/TAZOBACTAM: SIGNIFICANT CHANGE UP
-  TIGECYCLINE: SIGNIFICANT CHANGE UP
-  TOBRAMYCIN: SIGNIFICANT CHANGE UP
-  TOBRAMYCIN: SIGNIFICANT CHANGE UP
-  TRIMETHOPRIM/SULFAMETHOXAZOLE: SIGNIFICANT CHANGE UP
-  TRIMETHOPRIM/SULFAMETHOXAZOLE: SIGNIFICANT CHANGE UP
ANION GAP SERPL CALC-SCNC: 5 MMOL/L — SIGNIFICANT CHANGE UP (ref 5–17)
BUN SERPL-MCNC: 14 MG/DL — SIGNIFICANT CHANGE UP (ref 7–23)
CALCIUM SERPL-MCNC: 8.7 MG/DL — SIGNIFICANT CHANGE UP (ref 8.5–10.1)
CHLORIDE SERPL-SCNC: 110 MMOL/L — HIGH (ref 96–108)
CO2 SERPL-SCNC: 25 MMOL/L — SIGNIFICANT CHANGE UP (ref 22–31)
CREAT SERPL-MCNC: 1.06 MG/DL — SIGNIFICANT CHANGE UP (ref 0.5–1.3)
CULTURE RESULTS: ABNORMAL
EGFR: 68 ML/MIN/1.73M2 — SIGNIFICANT CHANGE UP
EGFR: 68 ML/MIN/1.73M2 — SIGNIFICANT CHANGE UP
GLUCOSE SERPL-MCNC: 86 MG/DL — SIGNIFICANT CHANGE UP (ref 70–99)
HCT VFR BLD CALC: 32.1 % — LOW (ref 39–50)
HGB BLD-MCNC: 11.6 G/DL — LOW (ref 13–17)
MCHC RBC-ENTMCNC: 27 PG — SIGNIFICANT CHANGE UP (ref 27–34)
MCHC RBC-ENTMCNC: 36.1 G/DL — HIGH (ref 32–36)
MCV RBC AUTO: 74.8 FL — LOW (ref 80–100)
METHOD TYPE: SIGNIFICANT CHANGE UP
NRBC BLD AUTO-RTO: 0 /100 WBCS — SIGNIFICANT CHANGE UP (ref 0–0)
ORGANISM # SPEC MICROSCOPIC CNT: ABNORMAL
ORGANISM # SPEC MICROSCOPIC CNT: SIGNIFICANT CHANGE UP
ORGANISM # SPEC MICROSCOPIC CNT: SIGNIFICANT CHANGE UP
PLATELET # BLD AUTO: 96 K/UL — LOW (ref 150–400)
POTASSIUM SERPL-MCNC: 3.4 MMOL/L — LOW (ref 3.5–5.3)
POTASSIUM SERPL-SCNC: 3.4 MMOL/L — LOW (ref 3.5–5.3)
RBC # BLD: 4.29 M/UL — SIGNIFICANT CHANGE UP (ref 4.2–5.8)
RBC # FLD: 14.4 % — SIGNIFICANT CHANGE UP (ref 10.3–14.5)
SODIUM SERPL-SCNC: 140 MMOL/L — SIGNIFICANT CHANGE UP (ref 135–145)
SPECIMEN SOURCE: SIGNIFICANT CHANGE UP
WBC # BLD: 6.76 K/UL — SIGNIFICANT CHANGE UP (ref 3.8–10.5)
WBC # FLD AUTO: 6.76 K/UL — SIGNIFICANT CHANGE UP (ref 3.8–10.5)

## 2025-07-05 PROCEDURE — 99232 SBSQ HOSP IP/OBS MODERATE 35: CPT

## 2025-07-05 RX ORDER — LISINOPRIL 5 MG/1
40 TABLET ORAL DAILY
Refills: 0 | Status: DISCONTINUED | OUTPATIENT
Start: 2025-07-05 | End: 2025-07-06

## 2025-07-05 RX ADMIN — Medication 650 MILLIGRAM(S): at 22:50

## 2025-07-05 RX ADMIN — ATORVASTATIN CALCIUM 40 MILLIGRAM(S): 80 TABLET, FILM COATED ORAL at 22:50

## 2025-07-05 RX ADMIN — FINASTERIDE 5 MILLIGRAM(S): 1 TABLET, FILM COATED ORAL at 11:59

## 2025-07-05 RX ADMIN — LISINOPRIL 40 MILLIGRAM(S): 5 TABLET ORAL at 08:26

## 2025-07-05 RX ADMIN — HEPARIN SODIUM 5000 UNIT(S): 1000 INJECTION INTRAVENOUS; SUBCUTANEOUS at 18:24

## 2025-07-05 RX ADMIN — Medication 81 MILLIGRAM(S): at 11:59

## 2025-07-05 RX ADMIN — Medication 40 MILLIEQUIVALENT(S): at 14:09

## 2025-07-05 RX ADMIN — AMLODIPINE BESYLATE 10 MILLIGRAM(S): 10 TABLET ORAL at 05:26

## 2025-07-05 RX ADMIN — CARVEDILOL 3.12 MILLIGRAM(S): 3.12 TABLET, FILM COATED ORAL at 18:24

## 2025-07-05 RX ADMIN — Medication 650 MILLIGRAM(S): at 23:50

## 2025-07-05 RX ADMIN — HEPARIN SODIUM 5000 UNIT(S): 1000 INJECTION INTRAVENOUS; SUBCUTANEOUS at 05:26

## 2025-07-05 RX ADMIN — CARVEDILOL 3.12 MILLIGRAM(S): 3.12 TABLET, FILM COATED ORAL at 05:26

## 2025-07-05 RX ADMIN — TAMSULOSIN HYDROCHLORIDE 0.8 MILLIGRAM(S): 0.4 CAPSULE ORAL at 22:49

## 2025-07-05 RX ADMIN — CEFTRIAXONE 100 MILLIGRAM(S): 500 INJECTION, POWDER, FOR SOLUTION INTRAMUSCULAR; INTRAVENOUS at 05:28

## 2025-07-06 VITALS
RESPIRATION RATE: 18 BRPM | OXYGEN SATURATION: 97 % | HEART RATE: 87 BPM | SYSTOLIC BLOOD PRESSURE: 153 MMHG | DIASTOLIC BLOOD PRESSURE: 80 MMHG | TEMPERATURE: 99 F

## 2025-07-06 LAB
ANION GAP SERPL CALC-SCNC: 6 MMOL/L — SIGNIFICANT CHANGE UP (ref 5–17)
BUN SERPL-MCNC: 14 MG/DL — SIGNIFICANT CHANGE UP (ref 7–23)
CALCIUM SERPL-MCNC: 9.3 MG/DL — SIGNIFICANT CHANGE UP (ref 8.5–10.1)
CHLORIDE SERPL-SCNC: 109 MMOL/L — HIGH (ref 96–108)
CO2 SERPL-SCNC: 27 MMOL/L — SIGNIFICANT CHANGE UP (ref 22–31)
CREAT SERPL-MCNC: 1.1 MG/DL — SIGNIFICANT CHANGE UP (ref 0.5–1.3)
EGFR: 65 ML/MIN/1.73M2 — SIGNIFICANT CHANGE UP
EGFR: 65 ML/MIN/1.73M2 — SIGNIFICANT CHANGE UP
GLUCOSE SERPL-MCNC: 76 MG/DL — SIGNIFICANT CHANGE UP (ref 70–99)
HCT VFR BLD CALC: 33.9 % — LOW (ref 39–50)
HGB BLD-MCNC: 12 G/DL — LOW (ref 13–17)
MCHC RBC-ENTMCNC: 26.4 PG — LOW (ref 27–34)
MCHC RBC-ENTMCNC: 35.4 G/DL — SIGNIFICANT CHANGE UP (ref 32–36)
MCV RBC AUTO: 74.7 FL — LOW (ref 80–100)
NRBC BLD AUTO-RTO: 0 /100 WBCS — SIGNIFICANT CHANGE UP (ref 0–0)
PLATELET # BLD AUTO: 113 K/UL — LOW (ref 150–400)
POTASSIUM SERPL-MCNC: 3.7 MMOL/L — SIGNIFICANT CHANGE UP (ref 3.5–5.3)
POTASSIUM SERPL-SCNC: 3.7 MMOL/L — SIGNIFICANT CHANGE UP (ref 3.5–5.3)
RBC # BLD: 4.54 M/UL — SIGNIFICANT CHANGE UP (ref 4.2–5.8)
RBC # FLD: 14.2 % — SIGNIFICANT CHANGE UP (ref 10.3–14.5)
SODIUM SERPL-SCNC: 142 MMOL/L — SIGNIFICANT CHANGE UP (ref 135–145)
WBC # BLD: 6.18 K/UL — SIGNIFICANT CHANGE UP (ref 3.8–10.5)
WBC # FLD AUTO: 6.18 K/UL — SIGNIFICANT CHANGE UP (ref 3.8–10.5)

## 2025-07-06 PROCEDURE — 99239 HOSP IP/OBS DSCHRG MGMT >30: CPT

## 2025-07-06 RX ORDER — ASPIRIN 325 MG
1 TABLET ORAL
Qty: 0 | Refills: 0 | DISCHARGE
Start: 2025-07-06

## 2025-07-06 RX ORDER — LISINOPRIL 5 MG/1
1 TABLET ORAL
Refills: 0 | DISCHARGE

## 2025-07-06 RX ORDER — CEFPODOXIME PROXETIL 200 MG/1
1 TABLET, FILM COATED ORAL
Qty: 10 | Refills: 0
Start: 2025-07-06 | End: 2025-07-10

## 2025-07-06 RX ORDER — LISINOPRIL 5 MG/1
1 TABLET ORAL
Qty: 0 | Refills: 0 | DISCHARGE
Start: 2025-07-06

## 2025-07-06 RX ORDER — AMLODIPINE BESYLATE 10 MG/1
1 TABLET ORAL
Refills: 0 | DISCHARGE

## 2025-07-06 RX ORDER — AMLODIPINE BESYLATE 10 MG/1
1 TABLET ORAL
Qty: 0 | Refills: 0 | DISCHARGE
Start: 2025-07-06

## 2025-07-06 RX ORDER — ATORVASTATIN CALCIUM 80 MG/1
1 TABLET, FILM COATED ORAL
Qty: 0 | Refills: 0 | DISCHARGE
Start: 2025-07-06

## 2025-07-06 RX ORDER — TAMSULOSIN HYDROCHLORIDE 0.4 MG/1
2 CAPSULE ORAL
Qty: 0 | Refills: 0 | DISCHARGE
Start: 2025-07-06

## 2025-07-06 RX ORDER — FINASTERIDE 1 MG/1
1 TABLET, FILM COATED ORAL
Qty: 0 | Refills: 0 | DISCHARGE
Start: 2025-07-06

## 2025-07-06 RX ADMIN — AMLODIPINE BESYLATE 10 MILLIGRAM(S): 10 TABLET ORAL at 05:54

## 2025-07-06 RX ADMIN — FINASTERIDE 5 MILLIGRAM(S): 1 TABLET, FILM COATED ORAL at 11:29

## 2025-07-06 RX ADMIN — Medication 81 MILLIGRAM(S): at 11:29

## 2025-07-06 RX ADMIN — CARVEDILOL 3.12 MILLIGRAM(S): 3.12 TABLET, FILM COATED ORAL at 18:24

## 2025-07-06 RX ADMIN — HEPARIN SODIUM 5000 UNIT(S): 1000 INJECTION INTRAVENOUS; SUBCUTANEOUS at 05:55

## 2025-07-06 RX ADMIN — HEPARIN SODIUM 5000 UNIT(S): 1000 INJECTION INTRAVENOUS; SUBCUTANEOUS at 18:24

## 2025-07-06 RX ADMIN — LISINOPRIL 40 MILLIGRAM(S): 5 TABLET ORAL at 05:54

## 2025-07-06 NOTE — DISCHARGE NOTE PROVIDER - NSFOLLOWUPCLINICS_GEN_ALL_ED_FT
[facility] General Internal Medicine  General Internal Medicine  [address] [place] [address]  Phone: [phone]  Fax:

## 2025-07-06 NOTE — DISCHARGE NOTE NURSING/CASE MANAGEMENT/SOCIAL WORK - PATIENT PORTAL LINK FT
You can access the FollowMyHealth Patient Portal offered by [facility] by registering at the following website: [email or web address]. By joining [employer]’s FollowMyHealth portal, you will also be able to view your health information using other applications (apps) compatible with our system.

## 2025-07-06 NOTE — PROGRESS NOTE ADULT - ASSESSMENT
85 year old man with Mhx of HTN, HLD, BPH with urinary retention recent parker removal  who presented to the Ed with dysuria and weakness.  found to have UTI      #UTI  started on CTX  -f/u UCX, Kleb and acinobacter, f/u sensitivities  f/u blood cx, NTD    #Thrombocytopenia  -likely n setting of infection  -appears stable  -CTM        #Elevated Troponin  -Likely in the setting of infection.   -Seen by cardiology, not consistent with ACS  -can follow up with cardiology after discharge.     GAMA  -likely prerenal as it improved with IVF  -patient does not have parker at this time  -Bladder scan today.    -ACEi held initially   -cr appears around baseline now  -c/w Flomax and finasteride  HTN.   c/w  amlodpione  c/w coreg 3.125mg PO BID (hold dose 6.25)  lisinopril resumed 7/5  CTAP: Circumferential bladder wall thickening with perivesicular fat stranding,   compatible with cystitis. Bilateral trace pleural effusions, new compared to prior 6/20/2025.        
85 year old man with Mhx of HTN, HLD, BPH with urinary retention recent parker removal  who presented to the Ed with dysuria and weakness.  found to have UTI      #UTI  started on CTX  -f/u UCX, Kleb and acinobacter, sensitive to cephalosporins (switch to cefpodoxime to complete 10 day course, as this was likely catheter associated, and he is still having symptoms, albeit improving. )  f/u blood cx, NTD    #Thrombocytopenia  -likely in setting of infection  -appears stable, improving  -CTM        #Elevated Troponin  -Likely in the setting of infection.   -Seen by cardiology, not consistent with ACS  -can follow up with cardiology after discharge.     GAMA  -likely prerenal as it improved with IVF  -patient does not have parker at this time  -Not retaining at this time   -ACEi held initially   -cr appears around baseline now  -c/w Flomax and finasteride  HTN.   c/w  amlodpione  resume home coreg  6.25mg PO BID  lisinopril resumed 7/5  CTAP: Circumferential bladder wall thickening with perivesicular fat stranding,   compatible with cystitis. Bilateral trace pleural effusions, new compared to prior 6/20/2025. Patient to follow up after discharge.     Discharge home. Spent > 35 minutes  
85 years old man with Mhx of HTN, HLD, BPH with urinary retention recent parker removal  who presented to the Ed with dysuria and weakness.  found to have UTI      #UTI  started on CTX  -f/u UCX, Kleb, f/u sensitivities  f/u blood cx, NTD    #THrombocytopenia  -likely n setting of infection  -appears stable  -CTM        #Elevated Troponin  -Likely in the setting of infection.   -Seen by cardiology, not consistent with ACS  -can follow up with cardiology after discharge.     GAMA  -likely prerenal as it improved with IVF  -patient does not have parker at this time  -Bladder scan today.    -ACEi held.  -c/w Flomax and finasteride  HTN.     resume amlodpione  resume coreg 3.125mg PO BID (hold dose 6.25)  lisinopril held  CTAP: Circumferential bladder wall thickening with perivesicular fat stranding,   compatible with cystitis. Bilateral trace pleural effusions, new compared to prior 6/20/2025.      
85 years old male     h/o HTN, HLD, BPH     present to ED with generalized weakness.    h/o BPH with urinary retention required Parker. Parker was removed on Monday.   reported dysuria    ekg, wi NSR, TWI V5,6.        uti/  SIRS  on arrival      in  er,  wbc  was  18.62, ,  lactate 3.3,  on iv rocephin/  follow  ucx    wbc /  crt,  decerasing     low  plts, likley  from  uti   Troponin, elevation       seen by  card/  not   from mi.  ekg , noted    cxr   no focal consolidation   GAMA, improving         IV fluid,  and   lisinopril  held   BPH.    Parker placed in ED. daughter Parul, no more urinary retention issue since started on Flomax and finasteride..  pvr  was  100  c/ and parker  removed      lHTN.      meds   on hold    dvt  ppx    ct  a/p,  pending     encounter   time   35  minutes

## 2025-07-06 NOTE — DISCHARGE NOTE PROVIDER - CARE PROVIDER_API CALL
Your primary care provider,   Phone: (   )    -  Fax: (   )    -  Follow Up Time: 1 week    Erlinda Lacy  Cardiovascular Disease  [address] [facility], [address] [address]  Phone: [phone]  Fax: [phone]  Follow Up Time: 1 week

## 2025-07-06 NOTE — DISCHARGE NOTE NURSING/CASE MANAGEMENT/SOCIAL WORK - NSDCPEFALRISK_GEN_ALL_CORE
For information on Fall & Injury Prevention, visit: [email or web address] OR  [email or web address] OR  https://www.cdc.gov/steadi/patient.html

## 2025-07-06 NOTE — DISCHARGE NOTE PROVIDER - HOSPITAL COURSE
85 year old Male with PMhx of HTN, HLD, BPH with urinary retention, recent parker removal, who presented to the ED with dysuria and weakness. Found to have UTI      #UTI  - Started on CTX  - F/u UCX, Kleb and Acinobacter, sensitive to Cephalosporins (switch to Cefpodoxime outpatient to complete 10 day course, as this was likely catheter associated, and he is still having symptoms, albeit improving.)  - F/u blood cx, NGTD    #Thrombocytopenia  - Likely in setting of infection  - Appears stable, improving  - CTM    #Elevated Troponin  - Likely in the setting of infection.   - Seen by cardiology, not consistent with ACS  "Elevated troponin with negative CK and CK MB in setting of acute illness. Not consistent with ACS."  - Can follow up with cardiology after discharge.     #GAMA  - Likely prerenal as it improved with IVF  - Patient does not have parker at this time  - Not retaining at this time  - ACEi held initially   - Cr appears around baseline now  - C/w Flomax and Finasteride    #HTN  - C/w Amlodipine  - Resume home Coreg 6.25mg PO BID  - Lisinopril resumed 7/5  - Resume home BP medication: Amlodipine, Coreg, Lisinopril, Hydralazine  - CTAP: Circumferential bladder wall thickening with perivesicular fat stranding,   compatible with cystitis. Bilateral trace pleural effusions, new compared to prior 6/20/2025. Patient to follow up after discharge.     Pt is medically stable for discharge home as per Dr. Hidalgo   85 year old Male with PMhx of HTN, HLD, BPH with urinary retention, recent parker removal, who presented to the ED with dysuria and weakness. Found to have UTI  with  Kleb and Acinobacter, sensitive to Cephalosporins. His symptoms improved on CTX, and will switch to Cefpodoxime outpatient to complete 10 day course, as this was likely catheter associated, and he is still having symptoms, albeit improving. Of note, patient was not retaining urine, and thus parker catheter not  placed. c/w Flomax and Finasteride   Please note,  he was noted to have Thrombocytopenia, stable, Likely in setting of infection, patient will follow up after discharge with primary doctor.  Please note, patient will elevated troponin, likely demand in setting of infection. he was noted to have small pleural effusions on CT. Patient to follow up with cardiology after discharge.          #UTI  - Started on CTX  - F/u UCX, Kleb and Acinobacter, sensitive to Cephalosporins (switch to Cefpodoxime outpatient to complete 10 day course, as this was likely catheter associated, and he is still having symptoms, albeit improving.)  - F/u blood cx, NGTD    #Thrombocytopenia  - Likely in setting of infection  - Appears stable, improving  - CTM    #Elevated Troponin  - Likely in the setting of infection.   - Seen by cardiology, not consistent with ACS  "Elevated troponin with negative CK and CK MB in setting of acute illness. Not consistent with ACS."  - Can follow up with cardiology after discharge.     #GAMA  - Likely prerenal as it improved with IVF  - Patient does not have parker at this time  - Not retaining at this time  - ACEi held initially   - Cr appears around baseline now  - C/w Flomax and Finasteride    #HTN  - C/w Amlodipine  - Resume home Coreg 6.25mg PO BID  - Lisinopril resumed 7/5  - Resume home BP medication: Amlodipine, Coreg, Lisinopril, Hydralazine  - CTAP: Circumferential bladder wall thickening with perivesicular fat stranding,   compatible with cystitis. Bilateral trace pleural effusions, new compared to prior 6/20/2025. Patient to follow up after discharge.     Pt is medically stable for discharge home as per Dr. Hidalgo

## 2025-07-06 NOTE — PATIENT PROFILE ADULT - FALL HARM RISK - HARM RISK INTERVENTIONS

## 2025-07-06 NOTE — DISCHARGE NOTE PROVIDER - NSDCMRMEDTOKEN_GEN_ALL_CORE_FT
amLODIPine 10 mg oral tablet: 1 tab(s) orally once a day  aspirin 81 mg oral delayed release tablet: 1 tab(s) orally once a day  atorvastatin 40 mg oral tablet: 1 tab(s) orally once a day (at bedtime)  carvedilol 6.25 mg oral tablet: 1 tab(s) orally every 12 hours  cefpodoxime 100 mg oral tablet: 1 tab(s) orally every 12 hours  finasteride 5 mg oral tablet: 1 tab(s) orally once a day  hydrALAZINE 100 mg oral tablet: 1 tab(s) orally 2 times a day  lisinopril 40 mg oral tablet: 1 tab(s) orally once a day  tamsulosin 0.4 mg oral capsule: 2 cap(s) orally once a day (at bedtime)

## 2025-07-06 NOTE — DISCHARGE NOTE PROVIDER - PROVIDER TOKENS
FREE:[LAST:[Your primary care provider],PHONE:[(   )    -],FAX:[(   )    -],FOLLOWUP:[1 week]],PROVIDER:[TOKEN:[[id number]],FOLLOWUP:[1 week]]

## 2025-07-06 NOTE — DISCHARGE NOTE NURSING/CASE MANAGEMENT/SOCIAL WORK - FINANCIAL ASSISTANCE
[facility] provides services at a reduced cost to those who are determined to be eligible through [facility]’s financial assistance program. Information regarding [facility]’s financial assistance program can be found by going to [email or web address] or by calling [phone].

## 2025-07-06 NOTE — DISCHARGE NOTE PROVIDER - NSDCCPCAREPLAN_GEN_ALL_CORE_FT
PRINCIPAL DISCHARGE DIAGNOSIS  Diagnosis: Acute UTI  Assessment and Plan of Treatment: Start Cefpodoxime 100mg every 12 hours for 5 days. Resume home medications. Follow up with primary care provider and cardiologist within 1 week of discharge.      SECONDARY DISCHARGE DIAGNOSES  Diagnosis: Sepsis  Assessment and Plan of Treatment:     Diagnosis: Elevated troponin  Assessment and Plan of Treatment:     Diagnosis: GAMA (acute kidney injury)  Assessment and Plan of Treatment:     Diagnosis: BPH (benign prostatic hyperplasia)  Assessment and Plan of Treatment:     Diagnosis: Benign essential HTN  Assessment and Plan of Treatment:      PRINCIPAL DISCHARGE DIAGNOSIS  Diagnosis: Acute UTI  Assessment and Plan of Treatment: You came to the hospital with burnong on urination, and were found to have a  urinary tract infection. You were stared on IV antibiotics with improvement and will be discharge on   Cefpodoxime outpatient to complete 10 day course. Please also continue Flomax and Finasteride.please note, you were not retatining urine, and thus a parker catherter was not placed.   if your symptoms get worse, or you start to have fever, fast heart rate, or low blood blood pressure, please call your doctor or return to the emergency room.    Please note,  your platelets were a little bit low, in setting of infection, please follow up after discharge with your  primary doctor.        SECONDARY DISCHARGE DIAGNOSES  Diagnosis: Elevated troponin  Assessment and Plan of Treatment: You had increased heart enzyme called troponin, which was likely demand in setting of infection and you were also noted to have small pleural effusions on CT. please follow up with cardiology after discharge.

## 2025-07-06 NOTE — PROGRESS NOTE ADULT - SUBJECTIVE AND OBJECTIVE BOX
PROGRESS NOTE:   Malka Myers : [id number]  Patient is a 85y old  Male who presents with a chief complaint of UTI, GAMA, elevated trop (03 Jul 2025 11:27)      SUBJECTIVE / OVERNIGHT EVENTS:No acute overnight events. patient still complaining of dysuria. Otherwise. no new complaints.        MEDICATIONS  (STANDING):  aspirin enteric coated 81 milliGRAM(s) Oral daily  atorvastatin 40 milliGRAM(s) Oral at bedtime  cefTRIAXone   IVPB 1000 milliGRAM(s) IV Intermittent every 24 hours  finasteride 5 milliGRAM(s) Oral daily  heparin   Injectable 5000 Unit(s) SubCutaneous every 12 hours  sodium chloride 0.9%. 1000 milliLiter(s) (75 mL/Hr) IV Continuous <Continuous>  tamsulosin 0.8 milliGRAM(s) Oral at bedtime    MEDICATIONS  (PRN):  acetaminophen     Tablet .. 650 milliGRAM(s) Oral every 6 hours PRN Temp greater or equal to 38C (100.4F), Mild Pain (1 - 3), Moderate Pain (4 - 6)  melatonin 3 milliGRAM(s) Oral at bedtime PRN Insomnia  ondansetron Injectable 4 milliGRAM(s) IV Push every 8 hours PRN Nausea and/or Vomiting      CAPILLARY BLOOD GLUCOSE        I&O's Summary    03 Jul 2025 07:01  -  04 Jul 2025 07:00  --------------------------------------------------------  IN: 1340 mL / OUT: 400 mL / NET: 940 mL        PHYSICAL EXAM:  Vital Signs Last 24 Hrs  T(C): 38 (04 Jul 2025 17:30), Max: 38 (04 Jul 2025 17:30)  T(F): 100.4 (04 Jul 2025 17:30), Max: 100.4 (04 Jul 2025 17:30)  HR: 68 (04 Jul 2025 17:30) (68 - 80)  BP: 169/81 (04 Jul 2025 17:30) (161/64 - 169/81)  BP(mean): --  RR: 18 (04 Jul 2025 17:30) (18 - 18)  SpO2: 93% (04 Jul 2025 17:30) (92% - 94%)    Parameters below as of 04 Jul 2025 17:30  Patient On (Oxygen Delivery Method): room air    Appearance: Normal	  HEENT:   Normal oral mucosa, PERRL, EOMI	  Lymphatic: No lymphadenopathy  Cardiovascular: Normal S1 S2, No JVD  Respiratory: Lungs clear to auscultation	  Gastrointestinal:  Soft, Non-tender, + BS	  Skin: No rash, No ecchymoses	  Extremities:       LABS:                        11.4   10.72 )-----------( 92       ( 04 Jul 2025 08:40 )             32.2     07-04    141  |  111[H]  |  18  ----------------------------<  85  3.6   |  24  |  1.22    Ca    8.3[L]      04 Jul 2025 08:40  Phos  2.5     07-04  Mg     1.9     07-04    TPro  5.5[L]  /  Alb  2.3[L]  /  TBili  0.7  /  DBili  x   /  AST  17  /  ALT  21  /  AlkPhos  56  07-03      CARDIAC MARKERS ( 03 Jul 2025 07:54 )  x     / x     / x     / x     / <1.0 ng/mL      Urinalysis Basic - ( 04 Jul 2025 08:40 )    Color: x / Appearance: x / SG: x / pH: x  Gluc: 85 mg/dL / Ketone: x  / Bili: x / Urobili: x   Blood: x / Protein: x / Nitrite: x   Leuk Esterase: x / RBC: x / WBC x   Sq Epi: x / Non Sq Epi: x / Bacteria: x        Culture - Urine (collected 02 Jul 2025 12:49)  Source: Clean Catch Clean Catch (Midstream)  Preliminary Report (04 Jul 2025 16:39):    >100,000 CFU/ml Klebsiella pneumoniae    Culture - Blood (collected 02 Jul 2025 11:04)  Source: Blood Blood-Peripheral  Preliminary Report (04 Jul 2025 15:01):    No growth at 48 Hours    Culture - Blood (collected 02 Jul 2025 11:04)  Source: Blood Blood-Peripheral  Preliminary Report (04 Jul 2025 15:01):    No growth at 48 Hours        RADIOLOGY & ADDITIONAL TESTS:  Results Reviewed:   Imaging Personally Reviewed:  Electrocardiogram Personally Reviewed:    COORDINATION OF CARE:  Care Discussed with Consultants/Other Providers [Y/N]:  Prior or Outpatient Records Reviewed [Y/N]:  
  date of service: 07-03-25 @ 11:28  [facility]  REVIEW OF SYSTEMS:  CONSTITUTIONAL: No fever,  no  weight loss  ENT:  No  tinnitus,   no   vertigo  NECK: No pain or stiffness  RESPIRATORY: No cough, wheezing, chills or hemoptysis;    No Shortness of Breath  CARDIOVASCULAR: No chest pain, palpitations, dizziness  GASTROINTESTINAL: No abdominal or epigastric pain. No nausea, vomiting, or hematemesis; No diarrhea  No melena or hematochezia.  GENITOURINARY: No dysuria, frequency, hematuria, or incontinence  NEUROLOGICAL: No headaches  SKIN: No itching,  no   rash  LYMPH Nodes: No enlarged glands  ENDOCRINE: No heat or cold intolerance  MUSCULOSKELETAL: No joint pain or swelling  PSYCHIATRIC: No depression, anxiety  HEME/LYMPH: No easy bruising, or bleeding gums  ALLERGY AND IMMUNOLOGIC: No hives or eczema	    MEDICATIONS  (STANDING):  aspirin enteric coated 81 milliGRAM(s) Oral daily  atorvastatin 40 milliGRAM(s) Oral at bedtime  carvedilol 3.125 milliGRAM(s) Oral every 12 hours  cefTRIAXone   IVPB 1000 milliGRAM(s) IV Intermittent every 24 hours  finasteride 5 milliGRAM(s) Oral daily  lactated ringers. 1000 milliLiter(s) (125 mL/Hr) IV Continuous <Continuous>  tamsulosin 0.8 milliGRAM(s) Oral at bedtime    MEDICATIONS  (PRN):  acetaminophen     Tablet .. 650 milliGRAM(s) Oral every 6 hours PRN Temp greater or equal to 38C (100.4F), Mild Pain (1 - 3), Moderate Pain (4 - 6)  melatonin 3 milliGRAM(s) Oral at bedtime PRN Insomnia  ondansetron Injectable 4 milliGRAM(s) IV Push every 8 hours PRN Nausea and/or Vomiting      Vital Signs Last 24 Hrs  T(C): 36.8 (03 Jul 2025 00:17), Max: 36.8 (02 Jul 2025 16:55)  T(F): 98.3 (03 Jul 2025 00:17), Max: 98.3 (02 Jul 2025 16:55)  HR: 90 (03 Jul 2025 00:17) (70 - 90)  BP: 138/71 (03 Jul 2025 00:17) (138/71 - 156/69)  BP(mean): --  RR: 18 (03 Jul 2025 00:17) (16 - 18)  SpO2: 99% (03 Jul 2025 00:17) (95% - 99%)    Parameters below as of 03 Jul 2025 00:17  Patient On (Oxygen Delivery Method): room air      CAPILLARY BLOOD GLUCOSE        I&O's Summary    02 Jul 2025 07:01  -  03 Jul 2025 07:00  --------------------------------------------------------  IN: 240 mL / OUT: 700 mL / NET: -460 mL    03 Jul 2025 07:01  -  03 Jul 2025 11:28  --------------------------------------------------------  IN: 120 mL / OUT: 0 mL / NET: 120 mL          Appearance: Normal	  HEENT:   Normal oral mucosa, PERRL, EOMI	  Lymphatic: No lymphadenopathy  Cardiovascular: Normal S1 S2, No JVD  Respiratory: Lungs clear to auscultation	  Gastrointestinal:  Soft, Non-tender, + BS	  Skin: No rash, No ecchymoses	  Extremities:     LABS:                        11.6   13.98 )-----------( 86       ( 03 Jul 2025 07:54 )             32.7     07-03    139  |  109[H]  |  27[H]  ----------------------------<  96  3.6   |  24  |  1.53[H]    Ca    8.1[L]      03 Jul 2025 07:54  Phos  2.1     07-03  Mg     1.8     07-03    TPro  5.5[L]  /  Alb  2.3[L]  /  TBili  0.7  /  DBili  x   /  AST  17  /  ALT  21  /  AlkPhos  56  07-03    PT/INR - ( 02 Jul 2025 11:04 )   PT: 17.5 sec;   INR: 1.56 ratio         PTT - ( 02 Jul 2025 11:04 )  PTT:30.9 sec  CARDIAC MARKERS ( 03 Jul 2025 07:54 )  x     / x     / x     / x     / <1.0 ng/mL  CARDIAC MARKERS ( 02 Jul 2025 15:04 )  x     / x     / x     / x     / <1.0 ng/mL      Urinalysis Basic - ( 03 Jul 2025 07:54 )    Color: x / Appearance: x / SG: x / pH: x  Gluc: 96 mg/dL / Ketone: x  / Bili: x / Urobili: x   Blood: x / Protein: x / Nitrite: x   Leuk Esterase: x / RBC: x / WBC x   Sq Epi: x / Non Sq Epi: x / Bacteria: x      Lactate, Blood: 3.0 mmol/L (07-02 @ 18:43)  Lactate, Blood: 3.3 mmol/L (07-02 @ 13:53)  Lactate, Blood: 2.6 mmol/L (07-02 @ 11:04)          Thyroid Stimulating Hormone, Serum: 0.513 uU/mL (06-18 @ 08:26)          Consultant(s) Notes Reviewed:      Care Discussed with Consultants/Other Providers:    
PROGRESS NOTE:     Patient is a 85y old  Male who presents with a chief complaint of UTI, GAMA, elevated trop (04 Jul 2025 18:25)      SUBJECTIVE / OVERNIGHT EVENTS:No acute overnight events.         MEDICATIONS  (STANDING):  amLODIPine   Tablet 10 milliGRAM(s) Oral daily  aspirin enteric coated 81 milliGRAM(s) Oral daily  atorvastatin 40 milliGRAM(s) Oral at bedtime  carvedilol 3.125 milliGRAM(s) Oral every 12 hours  finasteride 5 milliGRAM(s) Oral daily  heparin   Injectable 5000 Unit(s) SubCutaneous every 12 hours  tamsulosin 0.8 milliGRAM(s) Oral at bedtime    MEDICATIONS  (PRN):  acetaminophen     Tablet .. 650 milliGRAM(s) Oral every 6 hours PRN Temp greater or equal to 38C (100.4F), Mild Pain (1 - 3), Moderate Pain (4 - 6)  melatonin 3 milliGRAM(s) Oral at bedtime PRN Insomnia  ondansetron Injectable 4 milliGRAM(s) IV Push every 8 hours PRN Nausea and/or Vomiting      CAPILLARY BLOOD GLUCOSE        I&O's Summary    04 Jul 2025 07:01  -  05 Jul 2025 07:00  --------------------------------------------------------  IN: 0 mL / OUT: 1375 mL / NET: -1375 mL        PHYSICAL EXAM:  Vital Signs Last 24 Hrs  T(C): 37.6 (05 Jul 2025 04:52), Max: 38 (04 Jul 2025 17:30)  T(F): 99.7 (05 Jul 2025 04:52), Max: 100.4 (04 Jul 2025 17:30)  HR: 69 (05 Jul 2025 04:52) (65 - 79)  BP: 159/95 (05 Jul 2025 04:52) (159/95 - 179/88)  BP(mean): --  RR: 18 (05 Jul 2025 04:52) (18 - 18)  SpO2: 95% (05 Jul 2025 04:52) (93% - 95%)    Parameters below as of 05 Jul 2025 04:52  Patient On (Oxygen Delivery Method): room air        Appearance: Normal	  HEENT:   Normal oral mucosa, PERRL, EOMI	  Lymphatic: No lymphadenopathy  Cardiovascular: Normal S1 S2, No JVD  Respiratory: Lungs clear to auscultation	  Gastrointestinal:  Soft, Non-tender, + BS	  Skin: No rash, No ecchymoses	    LABS:                        11.6   6.76  )-----------( 96       ( 05 Jul 2025 07:10 )             32.1     07-04    141  |  111[H]  |  18  ----------------------------<  85  3.6   |  24  |  1.22    Ca    8.3[L]      04 Jul 2025 08:40  Phos  2.5     07-04  Mg     1.9     07-04    TPro  5.5[L]  /  Alb  2.3[L]  /  TBili  0.7  /  DBili  x   /  AST  17  /  ALT  21  /  AlkPhos  56  07-03      CARDIAC MARKERS ( 03 Jul 2025 07:54 )  x     / x     / x     / x     / <1.0 ng/mL      Urinalysis Basic - ( 04 Jul 2025 08:40 )    Color: x / Appearance: x / SG: x / pH: x  Gluc: 85 mg/dL / Ketone: x  / Bili: x / Urobili: x   Blood: x / Protein: x / Nitrite: x   Leuk Esterase: x / RBC: x / WBC x   Sq Epi: x / Non Sq Epi: x / Bacteria: x        Culture - Urine (collected 02 Jul 2025 12:49)  Source: Clean Catch Clean Catch (Midstream)  Final Report (05 Jul 2025 03:10):    >100,000 CFU/ml Klebsiella pneumoniae    >100,000 CFU/ml Acinetobacter baumannii/nosocomialis group    Culture in progress  Organism: Klebsiella pneumoniae (05 Jul 2025 02:45)  Organism: Klebsiella pneumoniae (05 Jul 2025 02:45)    Culture - Blood (collected 02 Jul 2025 11:04)  Source: Blood Blood-Peripheral  Preliminary Report (04 Jul 2025 15:01):    No growth at 48 Hours    Culture - Blood (collected 02 Jul 2025 11:04)  Source: Blood Blood-Peripheral  Preliminary Report (04 Jul 2025 15:01):    No growth at 48 Hours        RADIOLOGY & ADDITIONAL TESTS:  Results Reviewed:   Imaging Personally Reviewed:  Electrocardiogram Personally Reviewed:    COORDINATION OF CARE:  Care Discussed with Consultants/Other Providers [Y/N]:  Prior or Outpatient Records Reviewed [Y/N]:  
PROGRESS NOTE:     Patient is a 85y old  Male who presents with a chief complaint of UTI, GAMA, elevated trop (05 Jul 2025 07:45)      SUBJECTIVE / OVERNIGHT EVENTS: No acute overnight events. Patient dysuria continues to improve.         MEDICATIONS  (STANDING):  amLODIPine   Tablet 10 milliGRAM(s) Oral daily  aspirin enteric coated 81 milliGRAM(s) Oral daily  atorvastatin 40 milliGRAM(s) Oral at bedtime  carvedilol 3.125 milliGRAM(s) Oral every 12 hours  finasteride 5 milliGRAM(s) Oral daily  heparin   Injectable 5000 Unit(s) SubCutaneous every 12 hours  lisinopril 40 milliGRAM(s) Oral daily  tamsulosin 0.8 milliGRAM(s) Oral at bedtime    MEDICATIONS  (PRN):  acetaminophen     Tablet .. 650 milliGRAM(s) Oral every 6 hours PRN Temp greater or equal to 38C (100.4F), Mild Pain (1 - 3), Moderate Pain (4 - 6)  melatonin 3 milliGRAM(s) Oral at bedtime PRN Insomnia  ondansetron Injectable 4 milliGRAM(s) IV Push every 8 hours PRN Nausea and/or Vomiting      CAPILLARY BLOOD GLUCOSE        I&O's Summary    05 Jul 2025 07:01  -  06 Jul 2025 07:00  --------------------------------------------------------  IN: 0 mL / OUT: 1150 mL / NET: -1150 mL        PHYSICAL EXAM:  Vital Signs Last 24 Hrs  T(C): 36.9 (06 Jul 2025 04:53), Max: 37.2 (05 Jul 2025 15:55)  T(F): 98.4 (06 Jul 2025 04:53), Max: 98.9 (05 Jul 2025 15:55)  HR: 69 (06 Jul 2025 04:53) (68 - 87)  BP: 161/84 (06 Jul 2025 04:53) (145/73 - 166/80)  BP(mean): --  RR: 18 (06 Jul 2025 04:53) (18 - 19)  SpO2: 97% (06 Jul 2025 04:53) (92% - 97%)    Parameters below as of 06 Jul 2025 04:53  Patient On (Oxygen Delivery Method): room air        Appearance: Normal	  HEENT:   Normal oral mucosa, PERRL, EOMI	  Lymphatic: No lymphadenopathy  Cardiovascular: Normal S1 S2, No JVD  Respiratory: Lungs clear to auscultation	  Gastrointestinal:  Soft, Non-tender, + BS	  Skin: No rash, No ecchymoses	  LABS:                        12.0   6.18  )-----------( 113      ( 06 Jul 2025 06:40 )             33.9     07-06    142  |  109[H]  |  14  ----------------------------<  76  3.7   |  27  |  1.10    Ca    9.3      06 Jul 2025 06:40  Phos  2.5     07-04  Mg     1.9     07-04            Urinalysis Basic - ( 06 Jul 2025 06:40 )    Color: x / Appearance: x / SG: x / pH: x  Gluc: 76 mg/dL / Ketone: x  / Bili: x / Urobili: x   Blood: x / Protein: x / Nitrite: x   Leuk Esterase: x / RBC: x / WBC x   Sq Epi: x / Non Sq Epi: x / Bacteria: x          RADIOLOGY & ADDITIONAL TESTS:  Results Reviewed:   Imaging Personally Reviewed:  Electrocardiogram Personally Reviewed:    COORDINATION OF CARE:  Care Discussed with Consultants/Other Providers [Y/N]:  Prior or Outpatient Records Reviewed [Y/N]:

## 2025-07-07 LAB
CULTURE RESULTS: SIGNIFICANT CHANGE UP
CULTURE RESULTS: SIGNIFICANT CHANGE UP
SPECIMEN SOURCE: SIGNIFICANT CHANGE UP
SPECIMEN SOURCE: SIGNIFICANT CHANGE UP

## 2025-07-19 DIAGNOSIS — T83.511A INFECTION AND INFLAMMATORY REACTION DUE TO INDWELLING URETHRAL CATHETER, INITIAL ENCOUNTER: ICD-10-CM
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
## 2025-07-19 DIAGNOSIS — N40.1 BENIGN PROSTATIC HYPERPLASIA WITH LOWER URINARY TRACT SYMPTOMS: ICD-10-CM

## 2025-07-19 DIAGNOSIS — E78.5 HYPERLIPIDEMIA, UNSPECIFIED: ICD-10-CM

## 2025-07-19 DIAGNOSIS — D69.6 THROMBOCYTOPENIA, UNSPECIFIED: ICD-10-CM
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
## 2025-07-19 DIAGNOSIS — N39.0 URINARY TRACT INFECTION, SITE NOT SPECIFIED: ICD-10-CM

## 2025-07-19 DIAGNOSIS — R33.8 OTHER RETENTION OF URINE: ICD-10-CM
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
## 2025-07-19 DIAGNOSIS — D69.59 OTHER SECONDARY THROMBOCYTOPENIA: ICD-10-CM

## 2025-07-19 DIAGNOSIS — B96.1 KLEBSIELLA PNEUMONIAE [K. PNEUMONIAE] AS THE CAUSE OF DISEASES CLASSIFIED ELSEWHERE: ICD-10-CM

## 2025-07-19 DIAGNOSIS — J90 PLEURAL EFFUSION, NOT ELSEWHERE CLASSIFIED: ICD-10-CM

## 2025-07-19 DIAGNOSIS — Z11.52 ENCOUNTER FOR SCREENING FOR COVID-19: ICD-10-CM

## 2025-07-19 DIAGNOSIS — N17.9 ACUTE KIDNEY FAILURE, UNSPECIFIED: ICD-10-CM

## 2025-07-19 DIAGNOSIS — Z79.82 LONG TERM (CURRENT) USE OF ASPIRIN: ICD-10-CM

## 2025-07-19 DIAGNOSIS — R79.89 OTHER SPECIFIED ABNORMAL FINDINGS OF BLOOD CHEMISTRY: ICD-10-CM

## 2025-07-19 DIAGNOSIS — I10 ESSENTIAL (PRIMARY) HYPERTENSION: ICD-10-CM
